# Patient Record
Sex: FEMALE | Race: WHITE | NOT HISPANIC OR LATINO | Employment: FULL TIME | URBAN - NONMETROPOLITAN AREA
[De-identification: names, ages, dates, MRNs, and addresses within clinical notes are randomized per-mention and may not be internally consistent; named-entity substitution may affect disease eponyms.]

---

## 2018-08-03 PROCEDURE — 88305 TISSUE EXAM BY PATHOLOGIST: CPT | Performed by: SURGERY

## 2018-08-06 ENCOUNTER — LAB REQUISITION (OUTPATIENT)
Dept: LAB | Facility: HOSPITAL | Age: 48
End: 2018-08-06

## 2018-08-06 DIAGNOSIS — Z00.00 ENCOUNTER FOR GENERAL ADULT MEDICAL EXAMINATION WITHOUT ABNORMAL FINDINGS: ICD-10-CM

## 2018-08-08 LAB
CYTO UR: NORMAL
LAB AP CASE REPORT: NORMAL
LAB AP CLINICAL INFORMATION: NORMAL
PATH REPORT.FINAL DX SPEC: NORMAL
PATH REPORT.GROSS SPEC: NORMAL

## 2019-09-11 ENCOUNTER — OFFICE VISIT (OUTPATIENT)
Dept: VASCULAR SURGERY | Age: 49
End: 2019-09-11
Payer: COMMERCIAL

## 2019-09-11 VITALS
WEIGHT: 155 LBS | HEIGHT: 63 IN | DIASTOLIC BLOOD PRESSURE: 69 MMHG | HEART RATE: 111 BPM | SYSTOLIC BLOOD PRESSURE: 132 MMHG | RESPIRATION RATE: 18 BRPM | TEMPERATURE: 98.2 F | BODY MASS INDEX: 27.46 KG/M2

## 2019-09-11 DIAGNOSIS — I73.9 PVD (PERIPHERAL VASCULAR DISEASE) (HCC): Primary | ICD-10-CM

## 2019-09-11 PROCEDURE — 99204 OFFICE O/P NEW MOD 45 MIN: CPT | Performed by: PHYSICIAN ASSISTANT

## 2019-09-11 RX ORDER — PAROXETINE 30 MG/1
TABLET, FILM COATED ORAL
COMMUNITY
Start: 2019-08-16

## 2019-09-11 RX ORDER — CLOPIDOGREL BISULFATE 75 MG/1
TABLET ORAL
COMMUNITY
Start: 2019-08-09

## 2019-09-11 RX ORDER — AMLODIPINE BESYLATE 10 MG/1
TABLET ORAL
COMMUNITY
Start: 2019-08-09

## 2019-09-11 RX ORDER — OXYCODONE AND ACETAMINOPHEN 7.5; 325 MG/1; MG/1
TABLET ORAL
COMMUNITY

## 2019-09-11 RX ORDER — CYCLOBENZAPRINE HCL 5 MG
TABLET ORAL
COMMUNITY
Start: 2019-08-14

## 2019-09-12 ENCOUNTER — TELEPHONE (OUTPATIENT)
Dept: VASCULAR SURGERY | Age: 49
End: 2019-09-12

## 2019-09-12 DIAGNOSIS — L81.9 DISCOLORATION OF SKIN OF FINGER: ICD-10-CM

## 2019-09-12 DIAGNOSIS — I73.9 PVD (PERIPHERAL VASCULAR DISEASE) (HCC): Primary | ICD-10-CM

## 2019-09-12 DIAGNOSIS — M79.645 PAIN IN LEFT FINGER(S): ICD-10-CM

## 2019-10-04 DIAGNOSIS — M79.645 PAIN IN LEFT FINGER(S): ICD-10-CM

## 2019-10-04 DIAGNOSIS — I73.9 PVD (PERIPHERAL VASCULAR DISEASE) (HCC): Primary | ICD-10-CM

## 2019-10-04 DIAGNOSIS — L81.9 DISCOLORATION OF SKIN OF FINGER: ICD-10-CM

## 2019-10-04 PROBLEM — I70.209 ATHEROSCLEROSIS OF NATIVE ARTERY OF EXTREMITY (HCC): Status: ACTIVE | Noted: 2019-10-04

## 2022-11-01 ENCOUNTER — APPOINTMENT (OUTPATIENT)
Dept: CT IMAGING | Facility: HOSPITAL | Age: 52
End: 2022-11-01

## 2022-11-01 ENCOUNTER — HOSPITAL ENCOUNTER (INPATIENT)
Facility: HOSPITAL | Age: 52
LOS: 1 days | Discharge: HOME | End: 2022-11-03
Attending: STUDENT IN AN ORGANIZED HEALTH CARE EDUCATION/TRAINING PROGRAM | Admitting: INTERNAL MEDICINE

## 2022-11-01 ENCOUNTER — APPOINTMENT (OUTPATIENT)
Dept: GENERAL RADIOLOGY | Facility: HOSPITAL | Age: 52
End: 2022-11-01

## 2022-11-01 DIAGNOSIS — R07.89 CHEST PAIN, ATYPICAL: ICD-10-CM

## 2022-11-01 DIAGNOSIS — I21.4 NSTEMI (NON-ST ELEVATED MYOCARDIAL INFARCTION): ICD-10-CM

## 2022-11-01 DIAGNOSIS — R77.8 ELEVATED TROPONIN: Primary | ICD-10-CM

## 2022-11-01 LAB
ALBUMIN SERPL-MCNC: 4.1 G/DL (ref 3.5–5.2)
ALBUMIN/GLOB SERPL: 1.5 G/DL
ALP SERPL-CCNC: 199 U/L (ref 39–117)
ALT SERPL W P-5'-P-CCNC: 60 U/L (ref 1–33)
ANION GAP SERPL CALCULATED.3IONS-SCNC: 14 MMOL/L (ref 5–15)
APTT PPP: 68.7 SECONDS (ref 24.1–35)
AST SERPL-CCNC: 35 U/L (ref 1–32)
BASOPHILS # BLD AUTO: 0.04 10*3/MM3 (ref 0–0.2)
BASOPHILS NFR BLD AUTO: 0.2 % (ref 0–1.5)
BILIRUB SERPL-MCNC: 0.3 MG/DL (ref 0–1.2)
BUN SERPL-MCNC: 13 MG/DL (ref 6–20)
BUN/CREAT SERPL: 24.5 (ref 7–25)
CALCIUM SPEC-SCNC: 8.9 MG/DL (ref 8.6–10.5)
CHLORIDE SERPL-SCNC: 103 MMOL/L (ref 98–107)
CO2 SERPL-SCNC: 22 MMOL/L (ref 22–29)
CREAT SERPL-MCNC: 0.53 MG/DL (ref 0.57–1)
DEPRECATED RDW RBC AUTO: 41.7 FL (ref 37–54)
EGFRCR SERPLBLD CKD-EPI 2021: 111.4 ML/MIN/1.73
EOSINOPHIL # BLD AUTO: 0 10*3/MM3 (ref 0–0.4)
EOSINOPHIL NFR BLD AUTO: 0 % (ref 0.3–6.2)
ERYTHROCYTE [DISTWIDTH] IN BLOOD BY AUTOMATED COUNT: 12.6 % (ref 12.3–15.4)
GLOBULIN UR ELPH-MCNC: 2.8 GM/DL
GLUCOSE SERPL-MCNC: 140 MG/DL (ref 65–99)
HCT VFR BLD AUTO: 35.9 % (ref 34–46.6)
HGB BLD-MCNC: 11.9 G/DL (ref 12–15.9)
HOLD SPECIMEN: NORMAL
HOLD SPECIMEN: NORMAL
IMM GRANULOCYTES # BLD AUTO: 0.13 10*3/MM3 (ref 0–0.05)
IMM GRANULOCYTES NFR BLD AUTO: 0.7 % (ref 0–0.5)
INR PPP: 1.15 (ref 0.91–1.09)
LIPASE SERPL-CCNC: 11 U/L (ref 13–60)
LYMPHOCYTES # BLD AUTO: 1.02 10*3/MM3 (ref 0.7–3.1)
LYMPHOCYTES NFR BLD AUTO: 5.5 % (ref 19.6–45.3)
MCH RBC QN AUTO: 30.1 PG (ref 26.6–33)
MCHC RBC AUTO-ENTMCNC: 33.1 G/DL (ref 31.5–35.7)
MCV RBC AUTO: 90.9 FL (ref 79–97)
MONOCYTES # BLD AUTO: 1.5 10*3/MM3 (ref 0.1–0.9)
MONOCYTES NFR BLD AUTO: 8.1 % (ref 5–12)
NEUTROPHILS NFR BLD AUTO: 15.78 10*3/MM3 (ref 1.7–7)
NEUTROPHILS NFR BLD AUTO: 85.5 % (ref 42.7–76)
NRBC BLD AUTO-RTO: 0 /100 WBC (ref 0–0.2)
PLATELET # BLD AUTO: 377 10*3/MM3 (ref 140–450)
PMV BLD AUTO: 9.7 FL (ref 6–12)
POTASSIUM SERPL-SCNC: 3.1 MMOL/L (ref 3.5–5.2)
PROT SERPL-MCNC: 6.9 G/DL (ref 6–8.5)
PROTHROMBIN TIME: 14.2 SECONDS (ref 11.9–14.6)
RBC # BLD AUTO: 3.95 10*6/MM3 (ref 3.77–5.28)
SARS-COV-2 RNA RESP QL NAA+PROBE: NOT DETECTED
SODIUM SERPL-SCNC: 139 MMOL/L (ref 136–145)
TROPONIN T SERPL-MCNC: 0.39 NG/ML (ref 0–0.03)
TROPONIN T SERPL-MCNC: 0.45 NG/ML (ref 0–0.03)
WBC NRBC COR # BLD: 18.47 10*3/MM3 (ref 3.4–10.8)
WHOLE BLOOD HOLD COAG: NORMAL
WHOLE BLOOD HOLD SPECIMEN: NORMAL

## 2022-11-01 PROCEDURE — 36415 COLL VENOUS BLD VENIPUNCTURE: CPT

## 2022-11-01 PROCEDURE — 71045 X-RAY EXAM CHEST 1 VIEW: CPT

## 2022-11-01 PROCEDURE — 83690 ASSAY OF LIPASE: CPT | Performed by: STUDENT IN AN ORGANIZED HEALTH CARE EDUCATION/TRAINING PROGRAM

## 2022-11-01 PROCEDURE — 93005 ELECTROCARDIOGRAM TRACING: CPT | Performed by: STUDENT IN AN ORGANIZED HEALTH CARE EDUCATION/TRAINING PROGRAM

## 2022-11-01 PROCEDURE — 93010 ELECTROCARDIOGRAM REPORT: CPT | Performed by: INTERNAL MEDICINE

## 2022-11-01 PROCEDURE — 80053 COMPREHEN METABOLIC PANEL: CPT | Performed by: STUDENT IN AN ORGANIZED HEALTH CARE EDUCATION/TRAINING PROGRAM

## 2022-11-01 PROCEDURE — 99285 EMERGENCY DEPT VISIT HI MDM: CPT

## 2022-11-01 PROCEDURE — U0003 INFECTIOUS AGENT DETECTION BY NUCLEIC ACID (DNA OR RNA); SEVERE ACUTE RESPIRATORY SYNDROME CORONAVIRUS 2 (SARS-COV-2) (CORONAVIRUS DISEASE [COVID-19]), AMPLIFIED PROBE TECHNIQUE, MAKING USE OF HIGH THROUGHPUT TECHNOLOGIES AS DESCRIBED BY CMS-2020-01-R: HCPCS | Performed by: STUDENT IN AN ORGANIZED HEALTH CARE EDUCATION/TRAINING PROGRAM

## 2022-11-01 PROCEDURE — 74177 CT ABD & PELVIS W/CONTRAST: CPT

## 2022-11-01 PROCEDURE — 85610 PROTHROMBIN TIME: CPT | Performed by: STUDENT IN AN ORGANIZED HEALTH CARE EDUCATION/TRAINING PROGRAM

## 2022-11-01 PROCEDURE — 71275 CT ANGIOGRAPHY CHEST: CPT

## 2022-11-01 PROCEDURE — 85025 COMPLETE CBC W/AUTO DIFF WBC: CPT | Performed by: STUDENT IN AN ORGANIZED HEALTH CARE EDUCATION/TRAINING PROGRAM

## 2022-11-01 PROCEDURE — 83735 ASSAY OF MAGNESIUM: CPT | Performed by: NURSE PRACTITIONER

## 2022-11-01 PROCEDURE — 85730 THROMBOPLASTIN TIME PARTIAL: CPT | Performed by: STUDENT IN AN ORGANIZED HEALTH CARE EDUCATION/TRAINING PROGRAM

## 2022-11-01 PROCEDURE — 84484 ASSAY OF TROPONIN QUANT: CPT | Performed by: STUDENT IN AN ORGANIZED HEALTH CARE EDUCATION/TRAINING PROGRAM

## 2022-11-01 PROCEDURE — 25010000002 FENTANYL CITRATE (PF) 50 MCG/ML SOLUTION: Performed by: STUDENT IN AN ORGANIZED HEALTH CARE EDUCATION/TRAINING PROGRAM

## 2022-11-01 PROCEDURE — 0 IOPAMIDOL PER 1 ML: Performed by: STUDENT IN AN ORGANIZED HEALTH CARE EDUCATION/TRAINING PROGRAM

## 2022-11-01 PROCEDURE — 0 HYDROMORPHONE 1 MG/ML SOLUTION: Performed by: STUDENT IN AN ORGANIZED HEALTH CARE EDUCATION/TRAINING PROGRAM

## 2022-11-01 RX ORDER — NITROGLYCERIN 20 MG/100ML
5-200 INJECTION INTRAVENOUS
Status: DISCONTINUED | OUTPATIENT
Start: 2022-11-01 | End: 2022-11-03 | Stop reason: HOSPADM

## 2022-11-01 RX ORDER — ENOXAPARIN SODIUM 100 MG/ML
1 INJECTION SUBCUTANEOUS ONCE
Status: COMPLETED | OUTPATIENT
Start: 2022-11-01 | End: 2022-11-02

## 2022-11-01 RX ORDER — FENTANYL CITRATE 50 UG/ML
1 INJECTION, SOLUTION INTRAMUSCULAR; INTRAVENOUS ONCE
Status: COMPLETED | OUTPATIENT
Start: 2022-11-01 | End: 2022-11-01

## 2022-11-01 RX ADMIN — FENTANYL CITRATE 76 MCG: 50 INJECTION INTRAMUSCULAR; INTRAVENOUS at 22:29

## 2022-11-01 RX ADMIN — IOPAMIDOL 100 ML: 755 INJECTION, SOLUTION INTRAVENOUS at 22:20

## 2022-11-01 RX ADMIN — HYDROMORPHONE HYDROCHLORIDE 0.5 MG: 1 INJECTION, SOLUTION INTRAMUSCULAR; INTRAVENOUS; SUBCUTANEOUS at 21:04

## 2022-11-02 PROBLEM — F41.9 ANXIETY DISORDER: Status: ACTIVE | Noted: 2022-11-02

## 2022-11-02 PROBLEM — D72.829 LEUKOCYTOSIS: Status: ACTIVE | Noted: 2022-11-02

## 2022-11-02 PROBLEM — E87.6 HYPOKALEMIA: Status: ACTIVE | Noted: 2022-11-02

## 2022-11-02 PROBLEM — R74.01 TRANSAMINITIS: Status: ACTIVE | Noted: 2022-11-02

## 2022-11-02 PROBLEM — I21.4 NSTEMI (NON-ST ELEVATED MYOCARDIAL INFARCTION): Status: ACTIVE | Noted: 2022-11-02

## 2022-11-02 PROBLEM — Z79.02 LONG TERM CURRENT USE OF ANTITHROMBOTICS/ANTIPLATELETS: Status: ACTIVE | Noted: 2022-11-02

## 2022-11-02 PROBLEM — R77.8 ELEVATED TROPONIN: Status: ACTIVE | Noted: 2022-11-02

## 2022-11-02 PROBLEM — R79.89 ELEVATED TROPONIN: Status: ACTIVE | Noted: 2022-11-02

## 2022-11-02 PROBLEM — R07.89 CHEST PAIN, ATYPICAL: Status: ACTIVE | Noted: 2022-11-02

## 2022-11-02 LAB
ALBUMIN SERPL-MCNC: 3.9 G/DL (ref 3.5–5.2)
ALBUMIN/GLOB SERPL: 1.4 G/DL
ALP SERPL-CCNC: 172 U/L (ref 39–117)
ALT SERPL W P-5'-P-CCNC: 50 U/L (ref 1–33)
ANION GAP SERPL CALCULATED.3IONS-SCNC: 11 MMOL/L (ref 5–15)
AST SERPL-CCNC: 28 U/L (ref 1–32)
BASOPHILS # BLD AUTO: 0.04 10*3/MM3 (ref 0–0.2)
BASOPHILS NFR BLD AUTO: 0.3 % (ref 0–1.5)
BILIRUB SERPL-MCNC: 0.4 MG/DL (ref 0–1.2)
BUN SERPL-MCNC: 13 MG/DL (ref 6–20)
BUN/CREAT SERPL: 20.3 (ref 7–25)
CALCIUM SPEC-SCNC: 8.6 MG/DL (ref 8.6–10.5)
CHLORIDE SERPL-SCNC: 105 MMOL/L (ref 98–107)
CHOLEST SERPL-MCNC: 148 MG/DL (ref 0–200)
CO2 SERPL-SCNC: 23 MMOL/L (ref 22–29)
CREAT SERPL-MCNC: 0.64 MG/DL (ref 0.57–1)
DEPRECATED RDW RBC AUTO: 41.9 FL (ref 37–54)
EGFRCR SERPLBLD CKD-EPI 2021: 106.5 ML/MIN/1.73
EOSINOPHIL # BLD AUTO: 0 10*3/MM3 (ref 0–0.4)
EOSINOPHIL NFR BLD AUTO: 0 % (ref 0.3–6.2)
ERYTHROCYTE [DISTWIDTH] IN BLOOD BY AUTOMATED COUNT: 12.6 % (ref 12.3–15.4)
GLOBULIN UR ELPH-MCNC: 2.7 GM/DL
GLUCOSE SERPL-MCNC: 137 MG/DL (ref 65–99)
HBA1C MFR BLD: 5.7 % (ref 4.8–5.6)
HCT VFR BLD AUTO: 34.3 % (ref 34–46.6)
HDLC SERPL-MCNC: 53 MG/DL (ref 40–60)
HGB BLD-MCNC: 11.2 G/DL (ref 12–15.9)
IMM GRANULOCYTES # BLD AUTO: 0.05 10*3/MM3 (ref 0–0.05)
IMM GRANULOCYTES NFR BLD AUTO: 0.4 % (ref 0–0.5)
LDLC SERPL CALC-MCNC: 74 MG/DL (ref 0–100)
LDLC/HDLC SERPL: 1.36 {RATIO}
LYMPHOCYTES # BLD AUTO: 1.78 10*3/MM3 (ref 0.7–3.1)
LYMPHOCYTES NFR BLD AUTO: 14.9 % (ref 19.6–45.3)
MAGNESIUM SERPL-MCNC: 1.7 MG/DL (ref 1.6–2.6)
MCH RBC QN AUTO: 29.9 PG (ref 26.6–33)
MCHC RBC AUTO-ENTMCNC: 32.7 G/DL (ref 31.5–35.7)
MCV RBC AUTO: 91.5 FL (ref 79–97)
MONOCYTES # BLD AUTO: 1.49 10*3/MM3 (ref 0.1–0.9)
MONOCYTES NFR BLD AUTO: 12.5 % (ref 5–12)
NEUTROPHILS NFR BLD AUTO: 71.9 % (ref 42.7–76)
NEUTROPHILS NFR BLD AUTO: 8.57 10*3/MM3 (ref 1.7–7)
NRBC BLD AUTO-RTO: 0 /100 WBC (ref 0–0.2)
NT-PROBNP SERPL-MCNC: ABNORMAL PG/ML (ref 0–900)
PLATELET # BLD AUTO: 315 10*3/MM3 (ref 140–450)
PMV BLD AUTO: 9.5 FL (ref 6–12)
POTASSIUM SERPL-SCNC: 4.3 MMOL/L (ref 3.5–5.2)
PROT SERPL-MCNC: 6.6 G/DL (ref 6–8.5)
RBC # BLD AUTO: 3.75 10*6/MM3 (ref 3.77–5.28)
SODIUM SERPL-SCNC: 139 MMOL/L (ref 136–145)
TRIGL SERPL-MCNC: 115 MG/DL (ref 0–150)
TROPONIN T SERPL-MCNC: 0.27 NG/ML (ref 0–0.03)
VLDLC SERPL-MCNC: 21 MG/DL (ref 5–40)
WBC NRBC COR # BLD: 11.93 10*3/MM3 (ref 3.4–10.8)

## 2022-11-02 PROCEDURE — 80053 COMPREHEN METABOLIC PANEL: CPT | Performed by: NURSE PRACTITIONER

## 2022-11-02 PROCEDURE — 93010 ELECTROCARDIOGRAM REPORT: CPT | Performed by: INTERNAL MEDICINE

## 2022-11-02 PROCEDURE — 25010000002 ENOXAPARIN PER 10 MG: Performed by: STUDENT IN AN ORGANIZED HEALTH CARE EDUCATION/TRAINING PROGRAM

## 2022-11-02 PROCEDURE — 25010000002 MIDAZOLAM PER 1 MG: Performed by: EMERGENCY MEDICINE

## 2022-11-02 PROCEDURE — 80061 LIPID PANEL: CPT | Performed by: NURSE PRACTITIONER

## 2022-11-02 PROCEDURE — B2111ZZ FLUOROSCOPY OF MULTIPLE CORONARY ARTERIES USING LOW OSMOLAR CONTRAST: ICD-10-PCS | Performed by: EMERGENCY MEDICINE

## 2022-11-02 PROCEDURE — B2151ZZ FLUOROSCOPY OF LEFT HEART USING LOW OSMOLAR CONTRAST: ICD-10-PCS | Performed by: EMERGENCY MEDICINE

## 2022-11-02 PROCEDURE — C1760 CLOSURE DEV, VASC: HCPCS | Performed by: EMERGENCY MEDICINE

## 2022-11-02 PROCEDURE — 36415 COLL VENOUS BLD VENIPUNCTURE: CPT | Performed by: NURSE PRACTITIONER

## 2022-11-02 PROCEDURE — 4A023N7 MEASUREMENT OF CARDIAC SAMPLING AND PRESSURE, LEFT HEART, PERCUTANEOUS APPROACH: ICD-10-PCS | Performed by: EMERGENCY MEDICINE

## 2022-11-02 PROCEDURE — C1894 INTRO/SHEATH, NON-LASER: HCPCS | Performed by: EMERGENCY MEDICINE

## 2022-11-02 PROCEDURE — 93458 L HRT ARTERY/VENTRICLE ANGIO: CPT | Performed by: EMERGENCY MEDICINE

## 2022-11-02 PROCEDURE — 25010000002 POTASSIUM CHLORIDE PER 2 MEQ: Performed by: STUDENT IN AN ORGANIZED HEALTH CARE EDUCATION/TRAINING PROGRAM

## 2022-11-02 PROCEDURE — 25010000002 KETOROLAC TROMETHAMINE PER 15 MG: Performed by: FAMILY MEDICINE

## 2022-11-02 PROCEDURE — 99152 MOD SED SAME PHYS/QHP 5/>YRS: CPT | Performed by: EMERGENCY MEDICINE

## 2022-11-02 PROCEDURE — 25010000002 HEPARIN (PORCINE) 1000-0.9 UT/500ML-% SOLUTION: Performed by: EMERGENCY MEDICINE

## 2022-11-02 PROCEDURE — 83036 HEMOGLOBIN GLYCOSYLATED A1C: CPT | Performed by: NURSE PRACTITIONER

## 2022-11-02 PROCEDURE — 85025 COMPLETE CBC W/AUTO DIFF WBC: CPT | Performed by: NURSE PRACTITIONER

## 2022-11-02 PROCEDURE — 83880 ASSAY OF NATRIURETIC PEPTIDE: CPT | Performed by: NURSE PRACTITIONER

## 2022-11-02 PROCEDURE — 25010000002 FENTANYL CITRATE (PF) 50 MCG/ML SOLUTION: Performed by: EMERGENCY MEDICINE

## 2022-11-02 PROCEDURE — 25010000002 LEVOFLOXACIN PER 250 MG: Performed by: EMERGENCY MEDICINE

## 2022-11-02 PROCEDURE — 99222 1ST HOSP IP/OBS MODERATE 55: CPT | Performed by: NURSE PRACTITIONER

## 2022-11-02 PROCEDURE — 0 IOPAMIDOL PER 1 ML: Performed by: EMERGENCY MEDICINE

## 2022-11-02 PROCEDURE — 25010000002 MORPHINE PER 10 MG: Performed by: INTERNAL MEDICINE

## 2022-11-02 PROCEDURE — 25010000002 HEPARIN (PORCINE) 2000-0.9 UNIT/L-% SOLUTION: Performed by: EMERGENCY MEDICINE

## 2022-11-02 PROCEDURE — 25010000002 DIPHENHYDRAMINE PER 50 MG: Performed by: EMERGENCY MEDICINE

## 2022-11-02 PROCEDURE — 25010000002 MORPHINE PER 10 MG: Performed by: NURSE PRACTITIONER

## 2022-11-02 PROCEDURE — 84484 ASSAY OF TROPONIN QUANT: CPT | Performed by: NURSE PRACTITIONER

## 2022-11-02 PROCEDURE — B41F1ZZ FLUOROSCOPY OF RIGHT LOWER EXTREMITY ARTERIES USING LOW OSMOLAR CONTRAST: ICD-10-PCS | Performed by: EMERGENCY MEDICINE

## 2022-11-02 PROCEDURE — 25010000002 ONDANSETRON PER 1 MG: Performed by: NURSE PRACTITIONER

## 2022-11-02 PROCEDURE — C1769 GUIDE WIRE: HCPCS | Performed by: EMERGENCY MEDICINE

## 2022-11-02 RX ORDER — ONDANSETRON 2 MG/ML
4 INJECTION INTRAMUSCULAR; INTRAVENOUS EVERY 6 HOURS PRN
Status: DISCONTINUED | OUTPATIENT
Start: 2022-11-02 | End: 2022-11-03 | Stop reason: HOSPADM

## 2022-11-02 RX ORDER — ONDANSETRON 2 MG/ML
4 INJECTION INTRAMUSCULAR; INTRAVENOUS EVERY 6 HOURS PRN
Status: DISCONTINUED | OUTPATIENT
Start: 2022-11-02 | End: 2022-11-02 | Stop reason: HOSPADM

## 2022-11-02 RX ORDER — LIDOCAINE HYDROCHLORIDE 20 MG/ML
5 SOLUTION OROPHARYNGEAL ONCE
Status: COMPLETED | OUTPATIENT
Start: 2022-11-02 | End: 2022-11-02

## 2022-11-02 RX ORDER — TRAZODONE HYDROCHLORIDE 50 MG/1
50 TABLET ORAL NIGHTLY
Status: DISCONTINUED | OUTPATIENT
Start: 2022-11-02 | End: 2022-11-03 | Stop reason: HOSPADM

## 2022-11-02 RX ORDER — SODIUM CHLORIDE 0.9 % (FLUSH) 0.9 %
10 SYRINGE (ML) INJECTION EVERY 12 HOURS SCHEDULED
Status: DISCONTINUED | OUTPATIENT
Start: 2022-11-02 | End: 2022-11-03 | Stop reason: HOSPADM

## 2022-11-02 RX ORDER — POTASSIUM CHLORIDE 750 MG/1
40 CAPSULE, EXTENDED RELEASE ORAL
Status: COMPLETED | OUTPATIENT
Start: 2022-11-02 | End: 2022-11-02

## 2022-11-02 RX ORDER — SERTRALINE HYDROCHLORIDE 100 MG/1
100 TABLET, FILM COATED ORAL DAILY
Status: DISCONTINUED | OUTPATIENT
Start: 2022-11-02 | End: 2022-11-03 | Stop reason: HOSPADM

## 2022-11-02 RX ORDER — ALUMINA, MAGNESIA, AND SIMETHICONE 2400; 2400; 240 MG/30ML; MG/30ML; MG/30ML
30 SUSPENSION ORAL ONCE
Status: COMPLETED | OUTPATIENT
Start: 2022-11-02 | End: 2022-11-02

## 2022-11-02 RX ORDER — LEVOFLOXACIN 5 MG/ML
500 INJECTION, SOLUTION INTRAVENOUS EVERY 24 HOURS
Status: DISCONTINUED | OUTPATIENT
Start: 2022-11-02 | End: 2022-11-03 | Stop reason: HOSPADM

## 2022-11-02 RX ORDER — HEPARIN SODIUM 200 [USP'U]/100ML
INJECTION, SOLUTION INTRAVENOUS
Status: DISCONTINUED | OUTPATIENT
Start: 2022-11-02 | End: 2022-11-02 | Stop reason: HOSPADM

## 2022-11-02 RX ORDER — DICLOFENAC SODIUM 75 MG/1
75 TABLET, DELAYED RELEASE ORAL 2 TIMES DAILY
COMMUNITY
End: 2022-11-03 | Stop reason: HOSPADM

## 2022-11-02 RX ORDER — GLIPIZIDE 5 MG/1
5 TABLET ORAL
COMMUNITY

## 2022-11-02 RX ORDER — TRAZODONE HYDROCHLORIDE 50 MG/1
50 TABLET ORAL NIGHTLY
COMMUNITY

## 2022-11-02 RX ORDER — SERTRALINE HYDROCHLORIDE 100 MG/1
100 TABLET, FILM COATED ORAL DAILY
COMMUNITY

## 2022-11-02 RX ORDER — ATORVASTATIN CALCIUM 10 MG/1
20 TABLET, FILM COATED ORAL DAILY
Status: DISCONTINUED | OUTPATIENT
Start: 2022-11-02 | End: 2022-11-02

## 2022-11-02 RX ORDER — DIPHENHYDRAMINE HYDROCHLORIDE 50 MG/ML
INJECTION INTRAMUSCULAR; INTRAVENOUS
Status: DISCONTINUED | OUTPATIENT
Start: 2022-11-02 | End: 2022-11-02 | Stop reason: HOSPADM

## 2022-11-02 RX ORDER — HYDROXYZINE PAMOATE 25 MG/1
25 CAPSULE ORAL NIGHTLY
COMMUNITY

## 2022-11-02 RX ORDER — AMLODIPINE BESYLATE 10 MG/1
10 TABLET ORAL DAILY
COMMUNITY
End: 2022-11-03 | Stop reason: HOSPADM

## 2022-11-02 RX ORDER — CYCLOBENZAPRINE HCL 5 MG
5 TABLET ORAL NIGHTLY
Status: DISCONTINUED | OUTPATIENT
Start: 2022-11-02 | End: 2022-11-03 | Stop reason: HOSPADM

## 2022-11-02 RX ORDER — LIDOCAINE HYDROCHLORIDE 20 MG/ML
INJECTION, SOLUTION INFILTRATION; PERINEURAL
Status: DISCONTINUED | OUTPATIENT
Start: 2022-11-02 | End: 2022-11-02 | Stop reason: HOSPADM

## 2022-11-02 RX ORDER — ONDANSETRON 4 MG/1
4 TABLET, FILM COATED ORAL EVERY 6 HOURS PRN
Status: DISCONTINUED | OUTPATIENT
Start: 2022-11-02 | End: 2022-11-03 | Stop reason: HOSPADM

## 2022-11-02 RX ORDER — ACETAMINOPHEN 160 MG/5ML
650 SOLUTION ORAL EVERY 4 HOURS PRN
Status: DISCONTINUED | OUTPATIENT
Start: 2022-11-02 | End: 2022-11-03 | Stop reason: HOSPADM

## 2022-11-02 RX ORDER — ATORVASTATIN CALCIUM 40 MG/1
40 TABLET, FILM COATED ORAL NIGHTLY
Status: DISCONTINUED | OUTPATIENT
Start: 2022-11-02 | End: 2022-11-02

## 2022-11-02 RX ORDER — MIDAZOLAM HYDROCHLORIDE 1 MG/ML
INJECTION INTRAMUSCULAR; INTRAVENOUS
Status: DISCONTINUED | OUTPATIENT
Start: 2022-11-02 | End: 2022-11-02 | Stop reason: HOSPADM

## 2022-11-02 RX ORDER — CLOPIDOGREL BISULFATE 75 MG/1
75 TABLET ORAL DAILY
Status: DISCONTINUED | OUTPATIENT
Start: 2022-11-02 | End: 2022-11-03 | Stop reason: HOSPADM

## 2022-11-02 RX ORDER — DIPHENHYDRAMINE HCL 12.5MG/5ML
12.5 LIQUID (ML) ORAL ONCE
Status: COMPLETED | OUTPATIENT
Start: 2022-11-02 | End: 2022-11-02

## 2022-11-02 RX ORDER — HYDROXYZINE 50 MG/1
50 TABLET, FILM COATED ORAL DAILY
Status: ON HOLD | COMMUNITY
End: 2022-11-02

## 2022-11-02 RX ORDER — FAMOTIDINE 10 MG/ML
20 INJECTION, SOLUTION INTRAVENOUS EVERY 12 HOURS SCHEDULED
Status: DISCONTINUED | OUTPATIENT
Start: 2022-11-02 | End: 2022-11-03 | Stop reason: HOSPADM

## 2022-11-02 RX ORDER — CLOPIDOGREL BISULFATE 75 MG/1
75 TABLET ORAL DAILY
COMMUNITY
End: 2022-11-17 | Stop reason: SDUPTHER

## 2022-11-02 RX ORDER — AMLODIPINE BESYLATE 10 MG/1
10 TABLET ORAL DAILY
Status: DISCONTINUED | OUTPATIENT
Start: 2022-11-02 | End: 2022-11-03

## 2022-11-02 RX ORDER — ACETAMINOPHEN 325 MG/1
650 TABLET ORAL EVERY 4 HOURS PRN
Status: DISCONTINUED | OUTPATIENT
Start: 2022-11-02 | End: 2022-11-03 | Stop reason: HOSPADM

## 2022-11-02 RX ORDER — FAMOTIDINE 20 MG/1
20 TABLET, FILM COATED ORAL 2 TIMES DAILY
COMMUNITY

## 2022-11-02 RX ORDER — ATORVASTATIN CALCIUM 20 MG/1
20 TABLET, FILM COATED ORAL DAILY
COMMUNITY
End: 2022-11-03 | Stop reason: HOSPADM

## 2022-11-02 RX ORDER — ACETAMINOPHEN 650 MG/1
650 SUPPOSITORY RECTAL EVERY 4 HOURS PRN
Status: DISCONTINUED | OUTPATIENT
Start: 2022-11-02 | End: 2022-11-03 | Stop reason: HOSPADM

## 2022-11-02 RX ORDER — SODIUM CHLORIDE 0.9 % (FLUSH) 0.9 %
3 SYRINGE (ML) INJECTION EVERY 12 HOURS SCHEDULED
Status: DISCONTINUED | OUTPATIENT
Start: 2022-11-02 | End: 2022-11-02 | Stop reason: HOSPADM

## 2022-11-02 RX ORDER — ATORVASTATIN CALCIUM 40 MG/1
40 TABLET, FILM COATED ORAL DAILY
Status: DISCONTINUED | OUTPATIENT
Start: 2022-11-02 | End: 2022-11-03 | Stop reason: HOSPADM

## 2022-11-02 RX ORDER — POTASSIUM CHLORIDE 14.9 MG/ML
20 INJECTION INTRAVENOUS ONCE
Status: COMPLETED | OUTPATIENT
Start: 2022-11-02 | End: 2022-11-02

## 2022-11-02 RX ORDER — GLIPIZIDE 5 MG/1
5 TABLET ORAL
Status: DISCONTINUED | OUTPATIENT
Start: 2022-11-02 | End: 2022-11-03 | Stop reason: HOSPADM

## 2022-11-02 RX ORDER — ACETAMINOPHEN 325 MG/1
650 TABLET ORAL EVERY 4 HOURS PRN
Status: DISCONTINUED | OUTPATIENT
Start: 2022-11-02 | End: 2022-11-02 | Stop reason: SDUPTHER

## 2022-11-02 RX ORDER — SODIUM CHLORIDE 0.9 % (FLUSH) 0.9 %
3-10 SYRINGE (ML) INJECTION AS NEEDED
Status: DISCONTINUED | OUTPATIENT
Start: 2022-11-02 | End: 2022-11-02 | Stop reason: HOSPADM

## 2022-11-02 RX ORDER — MORPHINE SULFATE 2 MG/ML
2 INJECTION, SOLUTION INTRAMUSCULAR; INTRAVENOUS ONCE
Status: COMPLETED | OUTPATIENT
Start: 2022-11-02 | End: 2022-11-02

## 2022-11-02 RX ORDER — NITROGLYCERIN 0.4 MG/1
0.4 TABLET SUBLINGUAL
Status: DISCONTINUED | OUTPATIENT
Start: 2022-11-02 | End: 2022-11-02 | Stop reason: HOSPADM

## 2022-11-02 RX ORDER — MORPHINE SULFATE 2 MG/ML
1 INJECTION, SOLUTION INTRAMUSCULAR; INTRAVENOUS ONCE
Status: COMPLETED | OUTPATIENT
Start: 2022-11-02 | End: 2022-11-02

## 2022-11-02 RX ORDER — CYCLOBENZAPRINE HCL 10 MG
10 TABLET ORAL NIGHTLY
COMMUNITY

## 2022-11-02 RX ORDER — METRONIDAZOLE 500 MG/100ML
500 INJECTION, SOLUTION INTRAVENOUS EVERY 8 HOURS
Status: DISCONTINUED | OUTPATIENT
Start: 2022-11-02 | End: 2022-11-03 | Stop reason: HOSPADM

## 2022-11-02 RX ORDER — BUSPIRONE HYDROCHLORIDE 15 MG/1
15 TABLET ORAL 3 TIMES DAILY
COMMUNITY

## 2022-11-02 RX ORDER — KETOROLAC TROMETHAMINE 15 MG/ML
15 INJECTION, SOLUTION INTRAMUSCULAR; INTRAVENOUS EVERY 6 HOURS PRN
Status: DISCONTINUED | OUTPATIENT
Start: 2022-11-02 | End: 2022-11-03 | Stop reason: HOSPADM

## 2022-11-02 RX ORDER — SODIUM CHLORIDE 0.9 % (FLUSH) 0.9 %
10 SYRINGE (ML) INJECTION AS NEEDED
Status: DISCONTINUED | OUTPATIENT
Start: 2022-11-02 | End: 2022-11-03 | Stop reason: HOSPADM

## 2022-11-02 RX ORDER — SODIUM CHLORIDE 9 MG/ML
50 INJECTION, SOLUTION INTRAVENOUS CONTINUOUS
Status: DISCONTINUED | OUTPATIENT
Start: 2022-11-02 | End: 2022-11-03 | Stop reason: HOSPADM

## 2022-11-02 RX ORDER — ENOXAPARIN SODIUM 100 MG/ML
1 INJECTION SUBCUTANEOUS EVERY 12 HOURS SCHEDULED
Status: DISCONTINUED | OUTPATIENT
Start: 2022-11-02 | End: 2022-11-03 | Stop reason: HOSPADM

## 2022-11-02 RX ORDER — HYDROXYZINE HYDROCHLORIDE 25 MG/1
50 TABLET, FILM COATED ORAL DAILY
Status: DISCONTINUED | OUTPATIENT
Start: 2022-11-02 | End: 2022-11-03 | Stop reason: HOSPADM

## 2022-11-02 RX ORDER — FENTANYL CITRATE 50 UG/ML
INJECTION, SOLUTION INTRAMUSCULAR; INTRAVENOUS
Status: DISCONTINUED | OUTPATIENT
Start: 2022-11-02 | End: 2022-11-02 | Stop reason: HOSPADM

## 2022-11-02 RX ADMIN — DIPHENHYDRAMINE HYDROCHLORIDE 12.5 MG: 12.5 SOLUTION ORAL at 01:08

## 2022-11-02 RX ADMIN — ONDANSETRON 4 MG: 2 INJECTION INTRAMUSCULAR; INTRAVENOUS at 10:53

## 2022-11-02 RX ADMIN — CLOPIDOGREL 75 MG: 75 TABLET, FILM COATED ORAL at 09:58

## 2022-11-02 RX ADMIN — Medication 10 ML: at 20:30

## 2022-11-02 RX ADMIN — POTASSIUM CHLORIDE 40 MEQ: 10 CAPSULE, COATED, EXTENDED RELEASE ORAL at 04:37

## 2022-11-02 RX ADMIN — DIPHENHYDRAMINE HYDROCHLORIDE 12.5 MG: 12.5 SOLUTION ORAL at 20:23

## 2022-11-02 RX ADMIN — LIDOCAINE HYDROCHLORIDE 5 ML: 20 SOLUTION ORAL; TOPICAL at 01:06

## 2022-11-02 RX ADMIN — BUSPIRONE HYDROCHLORIDE 15 MG: 5 TABLET ORAL at 20:27

## 2022-11-02 RX ADMIN — MORPHINE SULFATE 2 MG: 2 INJECTION, SOLUTION INTRAMUSCULAR; INTRAVENOUS at 10:47

## 2022-11-02 RX ADMIN — SERTRALINE HYDROCHLORIDE 100 MG: 100 TABLET, FILM COATED ORAL at 18:22

## 2022-11-02 RX ADMIN — LEVOFLOXACIN 500 MG: 5 INJECTION, SOLUTION INTRAVENOUS at 18:22

## 2022-11-02 RX ADMIN — LIDOCAINE HYDROCHLORIDE 5 ML: 20 SOLUTION ORAL; TOPICAL at 20:23

## 2022-11-02 RX ADMIN — FAMOTIDINE 20 MG: 10 INJECTION INTRAVENOUS at 08:41

## 2022-11-02 RX ADMIN — HYDROXYZINE HYDROCHLORIDE 50 MG: 25 TABLET ORAL at 18:22

## 2022-11-02 RX ADMIN — TRAZODONE HYDROCHLORIDE 50 MG: 50 TABLET ORAL at 03:06

## 2022-11-02 RX ADMIN — SODIUM CHLORIDE 100 ML/HR: 9 INJECTION, SOLUTION INTRAVENOUS at 18:22

## 2022-11-02 RX ADMIN — ENOXAPARIN SODIUM 80 MG: 100 INJECTION SUBCUTANEOUS at 00:56

## 2022-11-02 RX ADMIN — CYCLOBENZAPRINE 5 MG: 5 TABLET, FILM COATED ORAL at 20:27

## 2022-11-02 RX ADMIN — POTASSIUM CHLORIDE 40 MEQ: 10 CAPSULE, COATED, EXTENDED RELEASE ORAL at 01:10

## 2022-11-02 RX ADMIN — ALUMINUM HYDROXIDE, MAGNESIUM HYDROXIDE, AND DIMETHICONE 30 ML: 400; 400; 40 SUSPENSION ORAL at 20:16

## 2022-11-02 RX ADMIN — AMLODIPINE BESYLATE 10 MG: 10 TABLET ORAL at 10:47

## 2022-11-02 RX ADMIN — BUSPIRONE HYDROCHLORIDE 15 MG: 5 TABLET ORAL at 09:54

## 2022-11-02 RX ADMIN — FAMOTIDINE 20 MG: 10 INJECTION INTRAVENOUS at 20:29

## 2022-11-02 RX ADMIN — ALUMINUM HYDROXIDE, MAGNESIUM HYDROXIDE, AND DIMETHICONE 30 ML: 400; 400; 40 SUSPENSION ORAL at 01:06

## 2022-11-02 RX ADMIN — Medication 10 ML: at 09:58

## 2022-11-02 RX ADMIN — ACETAMINOPHEN 650 MG: 325 TABLET, FILM COATED ORAL at 02:40

## 2022-11-02 RX ADMIN — POTASSIUM CHLORIDE 20 MEQ: 14.9 INJECTION, SOLUTION INTRAVENOUS at 02:59

## 2022-11-02 RX ADMIN — GLIPIZIDE 5 MG: 5 TABLET ORAL at 18:22

## 2022-11-02 RX ADMIN — Medication 5000 UNITS: at 18:23

## 2022-11-02 RX ADMIN — ATORVASTATIN CALCIUM 40 MG: 40 TABLET, FILM COATED ORAL at 10:53

## 2022-11-02 RX ADMIN — BUSPIRONE HYDROCHLORIDE 15 MG: 5 TABLET ORAL at 03:06

## 2022-11-02 RX ADMIN — Medication 10 ML: at 03:05

## 2022-11-02 RX ADMIN — MORPHINE SULFATE 1 MG: 2 INJECTION, SOLUTION INTRAMUSCULAR; INTRAVENOUS at 17:32

## 2022-11-02 RX ADMIN — Medication 3 ML: at 10:54

## 2022-11-02 RX ADMIN — KETOROLAC TROMETHAMINE 15 MG: 15 INJECTION, SOLUTION INTRAMUSCULAR; INTRAVENOUS at 20:29

## 2022-11-02 RX ADMIN — METRONIDAZOLE 500 MG: 500 INJECTION, SOLUTION INTRAVENOUS at 18:22

## 2022-11-02 RX ADMIN — FAMOTIDINE 20 MG: 10 INJECTION INTRAVENOUS at 01:01

## 2022-11-02 NOTE — PLAN OF CARE
Goal Outcome Evaluation:  Plan of Care Reviewed With: patient, spouse        Progress: no change   VSS, cath to right groin today, site is c/d/I, PPP distally, pt reports back pain that is unchanged, MDs all aware, medicated PRN as ordered, tolerating diet, , RA, IV ABX per orders, IVF, pt has no further questions or concerns, at this time, safety maintained, will continue to monitor.

## 2022-11-02 NOTE — CASE MANAGEMENT/SOCIAL WORK
Discharge Planning Assessment  [facility]     Patient Name: Dina Gonzalez  MRN: [id number]  Today's Date: 11/2/2022    Admit Date: 11/1/2022    Plan: Unable to screen patient for dc planning at this time. Patient out of room for procedure.   Discharge Needs Assessment    No documentation.                Discharge Plan     Row Name 11/02/22 1031       Plan    Plan Unable to screen patient for dc planning at this time. Patient out of room for procedure.              Continued Care and Services - Admitted Since 11/1/2022    Coordination has not been started for this encounter.          Demographic Summary    No documentation.                Functional Status    No documentation.                Psychosocial    No documentation.                Abuse/Neglect    No documentation.                Legal    No documentation.                Substance Abuse    No documentation.                Patient Forms    No documentation.                   Merlina A Fletcher, RN

## 2022-11-02 NOTE — PROGRESS NOTES
Patient seen and evaluated after midnight by Dr. Melchor.    Patient presented to the emergency department on 11/1/2022 as a transfer from [facility].  She noted that on Monday she had acute onset of chest pain with associated nausea.  She did throw up and noted having diarrhea as well.  She continued to have chest pain the following day and presented to the emergency department.  She described the chest pain as pressure in her chest that radiated to her back.  She also noted epigastric discomfort.  Work-up revealed troponin 0.445, ALT 60, AST 35, WBC 18.47.  Chest x-ray was negative for any acute findings.  CT of the abdomen and pelvis showed mild wall thickening of the transverse and sigmoid colon.  CTA of the chest showed no evidence of PE.  EKG showed sinus tach with incomplete right bundle branch block, lateral infarct present.  She was admitted for further evaluation.    Cardiology was consulted.  Troponin trended down to 0.273 and BNP 13,994. She remains on nitro drip but still complains of chest pain that she rates as 4 out of 10.  Cardiology recommended proceeding with heart cath today.    She does have a history of hyperlipidemia.  Lipid panel showed total cholesterol 148, HDL 53, LDL 74, triglycerides 115.  Atorvastatin was increased to 40 mg.    Patient has a history of hypertension and takes amlodipine at home.  Amlodipine was held this morning due to systolic blood pressure in the 90s.    CT of the abdomen and pelvis did show mild wall thickening of the transverse and sigmoid colon.  This may be related to decompressed state but also could represent an early mild acute colitis.  She has been started on Levaquin 500 mg daily and Flagyl.    Hemoglobin A1c is 5.7.  She does take glipizide 5 mg twice daily at home which was resumed.  Last blood glucose-137.    She reports feeling anxious/depressed.  Home dose of BuSpar, hydroxyzine, sertraline, trazodone have been resumed.    Patient has a  history of peripheral vascular disease for which she takes Plavix and Lipitor.  She has not followed with vascular recently.  Plavix was continued on admission and Lipitor dose was increased from 20 mg to 40 mg per cardiology.    Full dose Lovenox satisfies DVT prophylaxis    Electronically signed by KOBI Shin, 11/02/22, 12:51 PM CDT.

## 2022-11-02 NOTE — CONSULTS
"[facility] CONSULT NOTE    Referring Provider: Sonny Cortes*    Reason for Consultation: Chest Pain     Chief complaint:   Chief Complaint   Patient presents with   • NSTEMI       Subjective .     History of present illness:  Dina Gonzalez is a 52 y.o. female presents in transfer from [facility]. Patient notes Monday she had an acute onset of nausea and chest pain. She notes she threw up and had an episode of diarrhea. On Tuesday she continued to have chest pressure and nausea and made the decision to present to ER. She notes \"I think I had a heart attack Monday night.\" Her pressure was in her chest/ epigastric area but she notes it radiated through to her back. She denies fevers or chills. She has been found to have an elevated troponin which are trending down. She is a former smoker- which quit 3 years ago. She notes she has several siblings with coronary artery disease and her father. She has diabetes, hypertension and hyperlipidemia. 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 Though she notes she has not been seen by vascular in some time. EKG is abnormal with nonspecific ST changes. CTA of chest negative for pulmonary embolus and noted to have atelectasis in both lungs with emphysema. CTA of abdomen with mild wall thickening.       History  Past Medical History:   Diagnosis Date   • Anxiety and depression    • Hypertension    • Peripheral vascular disease (HCC)    ,   Past Surgical History:   Procedure Laterality Date   • APPENDECTOMY     • BACK SURGERY     •  SECTION     • CYST REMOVAL     ,   Family History   Problem Relation Age of Onset   • Diabetes Mother    • Hypertension Mother    • Kidney disease Mother    • Heart disease Mother    • Heart disease Sister    • Diabetes Sister    • Diabetes Brother    • Hypertension Brother    • Heart disease Brother    , "   Social History     Tobacco Use   • Smoking status: Former     Packs/day: 1.50     Years: 25.00     Pack years: 37.50     Types: Cigarettes   Substance Use Topics   • Alcohol use: Never   • Drug use: Never   ,     Medications    Prior to Admission medications    Medication Sig Start Date End Date Taking? Authorizing Provider   amLODIPine (NORVASC) 10 MG tablet Take 1 tablet by mouth Daily.    Gloria Suh MD   atorvastatin (LIPITOR) 20 MG tablet Take 1 tablet by mouth Daily.    Gloria Suh MD   busPIRone (BUSPAR) 15 MG tablet Take 1 tablet by mouth 2 (Two) Times a Day.    Gloria Suh MD   clopidogrel (PLAVIX) 75 MG tablet Take 1 tablet by mouth Daily.    Gloria Suh MD   cyclobenzaprine (FLEXERIL) 5 MG tablet Take 1 tablet by mouth Every Night.    Gloria Shu MD   famotidine (PEPCID) 20 MG tablet Take 1 tablet by mouth 2 (Two) Times a Day.    Gloria Suh MD   hydrOXYzine (ATARAX) 50 MG tablet Take 1 tablet by mouth Daily.    Gloria Suh MD   traZODone (DESYREL) 50 MG tablet Take 1 tablet by mouth Every Night.    Gloria Suh MD   vitamin D3 125 MCG (5000 UT) capsule capsule Take 1 capsule by mouth Daily.    Gloria Suh MD       Current Facility-Administered Medications   Medication Dose Route Frequency Provider Last Rate Last Admin   • acetaminophen (TYLENOL) tablet 650 mg  650 mg Oral Q4H PRN Angela Huynh APRN   650 mg at 11/02/22 0240    Or   • acetaminophen (TYLENOL) 160 MG/5ML solution 650 mg  650 mg Oral Q4H PRN Angela Huynh, APRJEAN        Or   • acetaminophen (TYLENOL) suppository 650 mg  650 mg Rectal Q4H PRN Angela Huynh, APRN       • amLODIPine (NORVASC) tablet 10 mg  10 mg Oral Daily Angela Huynh APRN       • atorvastatin (LIPITOR) tablet 20 mg  20 mg Oral Daily Angela Huynh, APRJEAN       • busPIRone (BUSPAR) tablet 15 mg  15 mg Oral BID Angela Huynh APRN   15 mg at 11/02/22 0306   •  clopidogrel (PLAVIX) tablet 75 mg  75 mg Oral Daily Angela Huynh APRN       • Enoxaparin Sodium (LOVENOX) syringe 80 mg  1 mg/kg Subcutaneous Q12H Angela Huynh APRN       • famotidine (PEPCID) injection 20 mg  20 mg Intravenous Q12H Angela Huynh APRN   20 mg at 11/02/22 0841   • influenza vac split quad (FLUZONE,FLUARIX,AFLURIA,FLULAVAL) injection 0.5 mL  0.5 mL Intramuscular During Hospitalization Al Melchor DO       • nitroglycerin (TRIDIL) 200 mcg/ml infusion  5-200 mcg/min Intravenous Titrated Amaury Hernandez MD 4.5 mL/hr at 11/02/22 0135 15 mcg/min at 11/02/22 0135   • ondansetron (ZOFRAN) tablet 4 mg  4 mg Oral Q6H PRN Angela Huynh APRN        Or   • ondansetron (ZOFRAN) injection 4 mg  4 mg Intravenous Q6H PRN Angela Huynh APRN       • sodium chloride 0.9 % flush 10 mL  10 mL Intravenous Q12H Angela Huynh APRN   10 mL at 11/02/22 0305   • sodium chloride 0.9 % flush 10 mL  10 mL Intravenous PRN Angela Huynh APRN       • traZODone (DESYREL) tablet 50 mg  50 mg Oral Nightly Angela Huynh APRN   50 mg at 11/02/22 0306       Allergies:  Penicillins and Betadine [povidone iodine]    Review of Systems  Review of Systems   Constitutional: Positive for malaise/fatigue. Negative for chills, decreased appetite, fever, weight gain and weight loss.   HENT: Negative for nosebleeds.    Eyes: Negative for visual disturbance.   Cardiovascular: Positive for chest pain and dyspnea on exertion. Negative for leg swelling, near-syncope, orthopnea, palpitations, paroxysmal nocturnal dyspnea and syncope.   Respiratory: Positive for shortness of breath. Negative for cough, hemoptysis and snoring.    Endocrine: Negative for cold intolerance and heat intolerance.   Hematologic/Lymphatic: Negative for bleeding problem. Does not bruise/bleed easily.   Skin: Negative for rash.   Musculoskeletal: Negative for back pain and falls.   Gastrointestinal: Positive for abdominal pain, nausea  "and vomiting. Negative for constipation, diarrhea, heartburn and melena.   Genitourinary: Negative for hematuria.   Neurological: Negative for dizziness, headaches and light-headedness.   Psychiatric/Behavioral: Negative for altered mental status.   Allergic/Immunologic: Negative for persistent infections.       Objective     Physical Exam:  Patient Vitals for the past 24 hrs:   BP Temp Temp src Pulse Resp SpO2 Height Weight   11/02/22 0742 91/53 100 °F (37.8 °C) Oral 98 18 96 % -- --   11/02/22 0500 110/50 -- -- 100 18 97 % -- --   11/02/22 0224 114/61 98.1 °F (36.7 °C) Oral 95 18 95 % -- 68.9 kg (152 lb)   11/02/22 0128 -- 99.6 °F (37.6 °C) Oral 94 -- 96 % -- --   11/02/22 0046 116/72 -- -- 100 -- 95 % -- --   11/01/22 2246 113/68 -- -- 101 -- 95 % -- --   11/01/22 2231 117/74 -- -- 103 -- 96 % -- --   11/01/22 2116 115/70 99 °F (37.2 °C) -- 105 -- 95 % -- --   11/01/22 2105 126/75 -- -- 103 -- -- -- --   11/01/22 2035 115/71 -- -- 105 18 93 % 160 cm (63\") 75.9 kg (167 lb 4.8 oz)     Constitutional:       Appearance: Healthy appearance. Not in distress.   Eyes:      Pupils: Pupils are equal, round, and reactive to light.   HENT:      Head: Normocephalic and atraumatic.      Nose: Nose normal.    Mouth/Throat:      Dentition: Normal.   Pulmonary:      Effort: Pulmonary effort is normal.      Breath sounds: Normal breath sounds.   Chest:      Chest wall: Not tender to palpatation.   Cardiovascular:      PMI at left midclavicular line. Normal rate. Regular rhythm.      Murmurs: There is no murmur.   Pulses:     Intact distal pulses.   Edema:     Peripheral edema absent.   Abdominal:      General: Bowel sounds are normal.      Palpations: Abdomen is soft.   Musculoskeletal: Normal range of motion.      Cervical back: Normal range of motion. Skin:     General: Skin is warm and dry.   Neurological:      Mental Status: Alert, oriented to person, place, and time and oriented to person, place and time.   Psychiatric:       "   Attention and Perception: Attention normal.         Mood and Affect: Mood normal.         Results Review:   I reviewed the patient's new clinical results.    Lab Results (last 72 hours)     Procedure Component Value Units Date/Time    Lipid Panel [[id number]] Collected: 11/02/22 0519    Specimen: Blood Updated: 11/02/22 0608     Total Cholesterol 148 mg/dL      Triglycerides 115 mg/dL      HDL Cholesterol 53 mg/dL      LDL Cholesterol  74 mg/dL      VLDL Cholesterol 21 mg/dL      LDL/HDL Ratio 1.36    Narrative:      Cholesterol Reference Ranges  (U.S. Department of Health and Human Services ATP III Classifications)    Desirable          <200 mg/dL  Borderline High    200-239 mg/dL  High Risk          >240 mg/dL      Triglyceride Reference Ranges  (U.S. Department of Health and Human Services ATP III Classifications)    Normal           <150 mg/dL  Borderline High  150-199 mg/dL  High             200-499 mg/dL  Very High        >500 mg/dL    HDL Reference Ranges  (U.S. Department of Health and Human Services ATP III Classifications)    Low     <40 mg/dl (major risk factor for CHD)  High    >60 mg/dl ('negative' risk factor for CHD)        LDL Reference Ranges  (U.S. Department of Health and Human Services ATP III Classifications)    Optimal          <100 mg/dL  Near Optimal     100-129 mg/dL  Borderline High  130-159 mg/dL  High             160-189 mg/dL  Very High        >189 mg/dL    Comprehensive Metabolic Panel [[id number]]  (Abnormal) Collected: 11/02/22 0519    Specimen: Blood Updated: 11/02/22 0608     Glucose 137 mg/dL      BUN 13 mg/dL      Creatinine 0.64 mg/dL      Sodium 139 mmol/L      Potassium 4.3 mmol/L      Chloride 105 mmol/L      CO2 23.0 mmol/L      Calcium 8.6 mg/dL      Total Protein 6.6 g/dL      Albumin 3.90 g/dL      ALT (SGPT) 50 U/L      AST (SGOT) 28 U/L      Alkaline Phosphatase 172 U/L      Total Bilirubin 0.4 mg/dL      Globulin 2.7 gm/dL      A/G Ratio 1.4 g/dL      BUN/Creatinine  Ratio 20.3     Anion Gap 11.0 mmol/L      eGFR 106.5 mL/min/1.73      Comment: National Kidney Foundation and American Society of Nephrology (ASN) Task Force recommended calculation based on the Chronic Kidney Disease Epidemiology Collaboration (CKD-EPI) equation refit without adjustment for race.       Narrative:      GFR Normal >60  Chronic Kidney Disease <60  Kidney Failure <15      Hemoglobin A1c [[id number]]  (Abnormal) Collected: 11/02/22 0519    Specimen: Blood Updated: 11/02/22 0605     Hemoglobin A1C 5.70 %     Narrative:      Hemoglobin A1C Ranges:    Increased Risk for Diabetes  5.7% to 6.4%  Diabetes                     >= 6.5%  Diabetic Goal                < 7.0%    Troponin [[id number]]  (Abnormal) Collected: 11/02/22 0519    Specimen: Blood Updated: 11/02/22 0558     Troponin T 0.273 ng/mL     Narrative:      Troponin T Reference Range:  <= 0.03 ng/mL-   Negative for AMI  >0.03 ng/mL-     Abnormal for myocardial necrosis.  Clinicians would have to utilize clinical acumen, EKG, Troponin and serial changes to determine if it is an Acute Myocardial Infarction or myocardial injury due to an underlying chronic condition.       Results may be falsely decreased if patient taking Biotin.      CBC Auto Differential [[id number]]  (Abnormal) Collected: 11/02/22 0519    Specimen: Blood Updated: 11/02/22 0541     WBC 11.93 10*3/mm3      RBC 3.75 10*6/mm3      Hemoglobin 11.2 g/dL      Hematocrit 34.3 %      MCV 91.5 fL      MCH 29.9 pg      MCHC 32.7 g/dL      RDW 12.6 %      RDW-SD 41.9 fl      MPV 9.5 fL      Platelets 315 10*3/mm3      Neutrophil % 71.9 %      Lymphocyte % 14.9 %      Monocyte % 12.5 %      Eosinophil % 0.0 %      Basophil % 0.3 %      Immature Grans % 0.4 %      Neutrophils, Absolute 8.57 10*3/mm3      Lymphocytes, Absolute 1.78 10*3/mm3      Monocytes, Absolute 1.49 10*3/mm3      Eosinophils, Absolute 0.00 10*3/mm3      Basophils, Absolute 0.04 10*3/mm3      Immature Grans, Absolute 0.05  10*3/mm3      nRBC 0.0 /100 WBC     Magnesium [[id number]]  (Normal) Collected: 11/01/22 2238    Specimen: Blood Updated: 11/02/22 0122     Magnesium 1.7 mg/dL     [place] [facility] [[id number]] Collected: 11/01/22 2040    Specimen: Blood Updated: 11/01/22 2346    Narrative:      The following orders were created for panel order [place] [facility].  Procedure                               Abnormality         Status                     ---------                               -----------         ------                     Green Top (Gel)[[id number]]                                  Final result               Lavender Top[[id number]]                                     Final result               Red Top[[id number]]                                          Final result               Light Blue Top[[id number]]                                   Final result                 Please view results for these tests on the individual orders.    Red Top [[id number]] Collected: 11/01/22 2238    Specimen: Blood Updated: 11/01/22 2346     Extra Tube Hold for add-ons.     Comment: Auto resulted.       Troponin [[id number]]  (Abnormal) Collected: 11/01/22 2238    Specimen: Blood Updated: 11/01/22 2309     Troponin T 0.393 ng/mL     Narrative:      Troponin T Reference Range:  <= 0.03 ng/mL-   Negative for AMI  >0.03 ng/mL-     Abnormal for myocardial necrosis.  Clinicians would have to utilize clinical acumen, EKG, Troponin and serial changes to determine if it is an Acute Myocardial Infarction or myocardial injury due to an underlying chronic condition.       Results may be falsely decreased if patient taking Biotin.      Lipase [[id number]]  (Abnormal) Collected: 11/01/22 2040    Specimen: Blood Updated: 11/01/22 2201     Lipase 11 U/L     COVID PRE-OP / PRE-PROCEDURE SCREENING ORDER (NO ISOLATION) - Swab, Nasopharynx [[id number]]  (Normal) Collected: 11/01/22 2055    Specimen: Swab from Nasopharynx Updated: 11/01/22 2147    Narrative:      The following  orders were created for panel order COVID PRE-OP / PRE-PROCEDURE SCREENING ORDER (NO ISOLATION) - Swab, Nasopharynx.  Procedure                               Abnormality         Status                     ---------                               -----------         ------                     COVID-19,CEPHEID/ALBINO,CO...[[id number]]  Normal              Final result                 Please view results for these tests on the individual orders.    COVID-19,CEPHEID/ALBINO,COR/CRESENCIO/PAD/KIRT/MAD IN-HOUSE(OR EMERGENT/ADD-ON),NP SWAB IN TRANSPORT MEDIA 3-4 HR TAT, RT-PCR - Swab, Nasopharynx [[id number]]  (Normal) Collected: 11/01/22 2055    Specimen: Swab from Nasopharynx Updated: 11/01/22 2147     COVID19 Not Detected    Narrative:      Fact sheet for providers: https://www.fda.gov/media/865367/download     Fact sheet for patients: https://www.fda.gov/media/434263/download  Fact sheet for providers: https://www.fda.gov/media/082875/download     Fact sheet for patients: https://www.fda.gov/media/705218/download    Protime-INR [[id number]]  (Abnormal) Collected: 11/01/22 2040    Specimen: Blood Updated: 11/01/22 2145     Protime 14.2 Seconds      INR 1.15    aPTT [[id number]]  (Abnormal) Collected: 11/01/22 2040    Specimen: Blood Updated: 11/01/22 2145     PTT 68.7 seconds     Green Top (Gel) [[id number]] Collected: 11/01/22 2040    Specimen: Blood Updated: 11/01/22 2145     Extra Tube Hold for add-ons.     Comment: Auto resulted.       Lavender Top [[id number]] Collected: 11/01/22 2040    Specimen: Blood Updated: 11/01/22 2145     Extra Tube hold for add-on     Comment: Auto resulted       Light Blue Top [[id number]] Collected: 11/01/22 2040    Specimen: Blood Updated: 11/01/22 2145     Extra Tube Hold for add-ons.     Comment: Auto resulted       Troponin [[id number]]  (Abnormal) Collected: 11/01/22 2040    Specimen: Blood Updated: 11/01/22 2140     Troponin T 0.445 ng/mL     Narrative:      Troponin T Reference Range:  <= 0.03 ng/mL-    Negative for AMI  >0.03 ng/mL-     Abnormal for myocardial necrosis.  Clinicians would have to utilize clinical acumen, EKG, Troponin and serial changes to determine if it is an Acute Myocardial Infarction or myocardial injury due to an underlying chronic condition.       Results may be falsely decreased if patient taking Biotin.      Comprehensive Metabolic Panel [[id number]]  (Abnormal) Collected: 11/01/22 2040    Specimen: Blood Updated: 11/01/22 2139     Glucose 140 mg/dL      BUN 13 mg/dL      Creatinine 0.53 mg/dL      Sodium 139 mmol/L      Potassium 3.1 mmol/L      Chloride 103 mmol/L      CO2 22.0 mmol/L      Calcium 8.9 mg/dL      Total Protein 6.9 g/dL      Albumin 4.10 g/dL      ALT (SGPT) 60 U/L      AST (SGOT) 35 U/L      Alkaline Phosphatase 199 U/L      Total Bilirubin 0.3 mg/dL      Globulin 2.8 gm/dL      A/G Ratio 1.5 g/dL      BUN/Creatinine Ratio 24.5     Anion Gap 14.0 mmol/L      eGFR 111.4 mL/min/1.73      Comment: National Kidney Foundation and American Society of Nephrology (ASN) Task Force recommended calculation based on the Chronic Kidney Disease Epidemiology Collaboration (CKD-EPI) equation refit without adjustment for race.       Narrative:      GFR Normal >60  Chronic Kidney Disease <60  Kidney Failure <15      CBC & Differential [[id number]]  (Abnormal) Collected: 11/01/22 2040    Specimen: Blood Updated: 11/01/22 2114    Narrative:      The following orders were created for panel order CBC & Differential.  Procedure                               Abnormality         Status                     ---------                               -----------         ------                     CBC Auto Differential[[id number]]        Abnormal            Final result                 Please view results for these tests on the individual orders.    CBC Auto Differential [[id number]]  (Abnormal) Collected: 11/01/22 2040    Specimen: Blood Updated: 11/01/22 2114     WBC 18.47 10*3/mm3      RBC 3.95 10*6/mm3       Hemoglobin 11.9 g/dL      Hematocrit 35.9 %      MCV 90.9 fL      MCH 30.1 pg      MCHC 33.1 g/dL      RDW 12.6 %      RDW-SD 41.7 fl      MPV 9.7 fL      Platelets 377 10*3/mm3      Neutrophil % 85.5 %      Lymphocyte % 5.5 %      Monocyte % 8.1 %      Eosinophil % 0.0 %      Basophil % 0.2 %      Immature Grans % 0.7 %      Neutrophils, Absolute 15.78 10*3/mm3      Lymphocytes, Absolute 1.02 10*3/mm3      Monocytes, Absolute 1.50 10*3/mm3      Eosinophils, Absolute 0.00 10*3/mm3      Basophils, Absolute 0.04 10*3/mm3      Immature Grans, Absolute 0.13 10*3/mm3      nRBC 0.0 /100 WBC           No results found for: ECHOEFEST    Imaging Results (Last 72 Hours)     Procedure Component Value Units Date/Time    CT Abdomen Pelvis With Contrast [[id number]] Collected: 11/02/22 0442     Updated: 11/02/22 0450    Narrative:      EXAM/TECHNIQUE: CT abdomen pelvis with IV contrast     INDICATION: chest pain, epigastric pain, vomiting, diarrhea, elevated  troponin     COMPARISON: None available.     DLP: 272 mGy cm. Automated exposure control was also utilized to  decrease patient radiation dose.     FINDINGS:     Lung bases are described in a separate same-day report.     No suspicious focal liver lesion. The gallbladder is surgically absent.  No biliary ductal dilatation. Pancreas appears normal. Spleen is  unremarkable. Adrenal glands appear normal.     No solid renal mass. No urolithiasis or hydronephrosis. No focal urinary  bladder wall thickening.      Sigmoid diverticulosis. There is a long segment of apparent sigmoid  colon wall thickening on axial image 75, although this may be secondary  to decompressed state. No definite pericolonic fat stranding. There is  also apparent wall thickening of the transverse colon which is also  decompressed. The appendix appears normal. No abnormal small bowel  distention or evidence of active small bowel inflammation.     No ascites or free pelvic fluid. Uterus and adnexa appear  unremarkable.  No pelvic mass or pelvic collection.     Atherosclerotic nonaneurysmal abdominal aorta. No abdominal or pelvic  adenopathy.      No acute soft tissue finding. Multilevel lumbar spine degenerative  change, greatest at L5-S1. No acute osseous finding.       Impression:         Mild wall thickening of the transverse and sigmoid colon. This may be  related to decompressed state but also could represent an early mild  acute colitis. No other acute abdominopelvic findings.     These findings are in agreement with the critical and emergent findings  from the [facility] preliminary report.     This report was finalized on 11/02/2022 04:47 by Dr. Paul Stevens MD.    CT Angiogram Chest [[id number]] Collected: 11/02/22 0435     Updated: 11/02/22 0443    Narrative:      EXAM/TECHNIQUE: CT chest angiography with 3D MIP images with IV contrast     INDICATION: chest pain, epigastric pain, vomiting, diarrhea, elevated  troponin     COMPARISON: None available.     DLP: 218 mGy cm. Automated exposure control was also utilized to  decrease patient radiation dose.     FINDINGS:     No evidence of pulmonary embolus. The main pulmonary artery is  nondilated. Thoracic aorta is normal in caliber and contains  atherosclerotic calcification. Trace pericardial fluid. Mild coronary  artery atherosclerotic calcification.      The central airways are clear. No pleural effusion. Atelectasis is  present in both posterior lower lungs. No definite consolidation.  Moderate centrilobular emphysema. No noncalcified pulmonary nodule or  mass identified. No enlarged lymph nodes in the chest.      No acute soft tissue finding. Upper abdomen is described in a separate  same-day report. No suspicious osseous finding.       Impression:         1.  No evidence of pulmonary embolus.      2.  Atelectasis in both posterior lower lungs.     3.  Moderate emphysema.     These findings are in agreement with the critical and emergent findings  from the  [facility] preliminary report.  This report was finalized on 11/02/2022 04:40 by Dr. Paul Stevens MD.    XR Chest 1 View [[id number]] Collected: 11/01/22 2108     Updated: 11/01/22 2112    Narrative:      EXAMINATION:  XR CHEST 1 VW-  11/1/2022 9:03 PM CDT     HISTORY: Chest pain.     COMPARISON: 02/21/2006.     TECHNIQUE: Single view AP image.     FINDINGS:  There is hypoventilation with vascular crowding. There is  mild atelectasis in the lung bases. There is mild stable bronchial wall  thickening. The heart is normal in size. No acute bony abnormality is  seen.       Impression:      1. Hypoventilation with vascular crowding. Mild atelectasis in the lung  bases.  2. Stable bronchial wall thickening.        This report was finalized on 11/01/2022 21:09 by Dr. Dariel Jaime MD.                Assessment & Plan       Elevated troponin    Chest pain, atypical, epigastric and with deep breathing    NSTEMI (non-ST elevated myocardial infarction) (HCC)    Hypokalemia    Transaminitis    Anxiety disorder    Leukocytosis    Long term current use of antithrombotics/antiplatelets    Plan:  NSTEMI - troponin trending down- which is suspected given severe symptoms Monday night. Strong family history of CAD with several siblings and father. Quit smoking 3 years ago. Diabetic and Hyperlipidemia. Pain is improved with nitro drip but still present. Will plan for heart cath today. NPO     Hyperlipidemia - Recommend high intensity statin. Increase Lipitor.     Hypertension - blood pressure stable.     Type II DM - management per primary team     Peripheral Vascular Disease - notes left upper arm arthrosclerosis. Treated with Plavix and Statin. Has not followed with vascular in some time.     Further orders per Dr. Yan     Thank you for asking us to follow this patient with you.       Electronically signed by KOBI Sepulveda, 11/02/22, 9:34 AM CDT.

## 2022-11-02 NOTE — PAYOR COMM NOTE
"  11/2/22 [facility] [phone]    [phone]      ER ADMIT TO INPATIENT ON 11/1/22 INPATIENT ORDER.        Dina Pedersen (52 y.o. Female)     Date of Birth   1970    Social Security Number       Address   [address] [place] [address]    Home Phone   [phone]    MRN   [id number]       [place]   Other    Marital Status                               Admission Date   11/1/22    Admission Type   Emergency    Admitting Provider   Alf Diehl DO    Attending Provider   Alf Diehl DO    Department, Room/Bed   [address], 445/1       Discharge Date       Discharge Disposition       Discharge Destination                               Attending Provider: Alf Diehl DO    Allergies: Penicillins, Betadine [Povidone Iodine]    Isolation: None   Infection: None   Code Status: CPR    Ht: 160 cm (63\")   Wt: 68.9 kg (152 lb)    Admission Cmt: None   Principal Problem: Elevated troponin [R77.8]                 Active Insurance as of 11/1/2022     Primary Coverage     Payor Plan Insurance Group Employer/Plan Group    [employer] BLUE CROSS [employer] BLUE SHIELD PPO [id number]     Payor Plan Address Payor Plan Phone Number Payor Plan Fax Number Effective Dates    [address] [phone]  1/1/2020 - None Entered    [place] [address]       Subscriber Name Subscriber Birth Date Member ID       DINA PEDERSEN 1970 [id number]                 Emergency Contacts      (Rel.) Home Phone Work Phone Mobile Phone    Jah Pedersen (Spouse) [phone] -- --         [facility] Encounter Date/Time: 11/1/2022 2025   Hospital Account: [id number]    MRN: [id number]   Patient:  Dina Pedersen   Contact Serial #: [id number]   SSN:          ENCOUNTER             Patient Class: Inpatient   Unit: [address] Service: Medicine     Bed: 445/1   Admitting Provider: Alf Diehl DO   Referring " Physician: Sonny Hager*   Attending Provider: Alf Diehl DO Adm Diagnosis: Elevated troponin [R77.8]               PATIENT          Name: Dina Pedersen : 1970 (52 yrs)   Address: [address] Sex: Female   City: [place] [id number]   County: [place]   Marital Status:  Ethnicity: NOT                                                                              Race: WHITE   Primary Care Provider: Carmencita Quintanilla APRN Patients Phone: Home Phone: [phone]         EMERGENCY CONTACT   Contact Name Legal Guardian? Relationship to Patient Home Phone Work Phone   1. CarlosJah  2. *No Contact Specified*      Spouse    [phone]              GUARANTOR            Guarantor: Dina Pedersen     : 1970   Address: [address] Sex: Female     CAMRYN [facility]     Relation to Patient: Self       Home Phone: [phone]   Guarantor ID: [id number]       Work Phone:     GUARANTOR EMPLOYER   Employer: [employer]         Status: FULL TIME   COVERAGE          PRIMARY INSURANCE   Payor: [employer] Plan: [employer] BLUE CROSS BLUE SHIELD PPO   Group Number: [id number] Insurance Type: INDEMNITY   Subscriber Name: DINA PEDERSEN Subscriber : 1970   Subscriber ID: [id number] Coverage Address: [place] [address]   Pat. Rel. to Subscriber: Self Coverage Phone: [phone]   SECONDARY INSURANCE   Payor: N/A Plan: N/A   Group Number:   Insurance Type:     Subscriber Name:   Subscriber :     Subscriber ID:   Coverage Address:     Pat. Rel. to Subscriber:   Coverage Phone:        Contact Serial # ([id number])       2022    Chart ID ([id number])              Angela Huynh APRN   Nurse Practitioner  Hospitalist  H&P      Cosign Needed  Date of Service:  22  Creation Time:  22     Cosign Needed        [facility]  "Services  HISTORY AND PHYSICAL     Date of Admission: 11/1/2022  Primary Care Physician: Carmencita Quintanilla APRN     Subjective      Chief Complaint: Transfer for higher level of care     History of Present Illness  Dina Gonzalez is a 52-year-old female with a past medical history of anxiety/depression, peripheral vascular disease, hypertension.  Patient presented to [facility] emergency department, with complaints of nausea and vomiting that started approximately midnight.  She states she developed mid epigastric pain today about 5 AM.  She denies having chest pain.  Work-up at [facility] did reveal elevated troponins and EKG that they felt was abnormal therefore she was transferred to [facility] emergency department.  Initial troponin 0.445, repeat 0.393.  Other findings potassium 3.1, transaminitis and white count 18.47.  [facility] White count 21.9 with a left shift.  Patient did receive heparin drip, Pepcid IV and aspirin 324 mg at 1902.  Currently nitroglycerin drip is in use and she continues to deny chest pain.  She states epigastric pain has resolved.  She does report severe pain with deep breath.  She states she has not had a recent illness however she did have diaphoresis and chills last evening.  She reports feeling to be able to check her temperature.  She has no other complaints.  Saturation is 97% on 2 L.  She is admitted for further evaluation treatment     Review of Systems   A 10 point review of system was completed, all negative except for those discussed in HPI                    Past Medical History:   Diagnosis Date   • Anxiety and depression     • Hypertension     • Peripheral vascular disease (HCC)               /72   Pulse 100   Temp 99 °F (37.2 °C)   Resp 18   Ht 160 cm (63\")   Wt 75.9 kg (167 lb 4.8 oz)   SpO2 95%   BMI 29.64 kg/m²   Physical Exam  Vitals reviewed.   Constitutional:       Appearance: She is ill-appearing. "   HENT:      Head: Normocephalic and atraumatic.      Mouth/Throat:      Mouth: Mucous membranes are dry.      Pharynx: Oropharynx is clear.   Eyes:      Extraocular Movements: Extraocular movements intact.      Conjunctiva/sclera: Conjunctivae normal.   Cardiovascular:      Rate and Rhythm: Regular rhythm. Tachycardia present.   Pulmonary:      Breath sounds: Normal breath sounds.      Comments: Shallow respirations due to pain with deep breathing  Abdominal:      General: There is no distension.      Palpations: Abdomen is soft.      Tenderness: There is no abdominal tenderness ( No epigastric tenderness on palpation).   Musculoskeletal:         General: Normal range of motion.      Cervical back: Normal range of motion and neck supple.      Right lower leg: No edema.      Left lower leg: No edema.   Skin:     General: Skin is warm and dry.   Neurological:      General: No focal deficit present.      Mental Status: She is alert and oriented to person, place, and time.   Psychiatric:         Behavior: Behavior normal.      Comments: Situational anxiety and with a history of anxiety disorder          Pertinent Data:   Lab Results (last 72 hours)      Procedure Component Value Units Date/Time     Troponin [[id number]]  (Abnormal) Collected: 11/01/22 2238     Specimen: Blood Updated: 11/01/22 2309       Troponin T 0.393 ng/mL       Lipase [[id number]]  (Abnormal) Collected: 11/01/22 2040     Specimen: Blood Updated: 11/01/22 2201       Lipase 11 U/L       COVID-19,CEPHEID/ALBINO,COR/CRESENCIO/PAD/KIRT/MAD IN-HOUSE(OR EMERGENT/ADD-ON),NP SWAB IN TRANSPORT MEDIA 3-4 HR TAT, RT-PCR - Swab, Nasopharynx [[id number]]  (Normal) Collected: 11/01/22 2055     Specimen: Swab from Nasopharynx Updated: 11/01/22 2147       COVID19 Not Detected     Protime-INR [[id number]]  (Abnormal) Collected: 11/01/22 2040     Specimen: Blood Updated: 11/01/22 2145       Protime 14.2 Seconds         INR 1.15     aPTT [[id number]]  (Abnormal) Collected:  11/01/22 2040     Specimen: Blood Updated: 11/01/22 2145       PTT 68.7 seconds       Troponin [[id number]]  (Abnormal) Collected: 11/01/22 2040     Specimen: Blood Updated: 11/01/22 2140       Troponin T 0.445 ng/mL       Comprehensive Metabolic Panel [[id number]]  (Abnormal) Collected: 11/01/22 2040     Specimen: Blood Updated: 11/01/22 2139       Glucose 140 mg/dL         BUN 13 mg/dL         Creatinine 0.53 mg/dL         Sodium 139 mmol/L         Potassium 3.1 mmol/L         Chloride 103 mmol/L         CO2 22.0 mmol/L         Calcium 8.9 mg/dL         Total Protein 6.9 g/dL         Albumin 4.10 g/dL         ALT (SGPT) 60 U/L         AST (SGOT) 35 U/L         Alkaline Phosphatase 199 U/L         Total Bilirubin 0.3 mg/dL         Globulin 2.8 gm/dL         A/G Ratio 1.5 g/dL         BUN/Creatinine Ratio 24.5       Anion Gap 14.0 mmol/L         eGFR 111.4 mL/min/1.73       CBC Auto Differential [[id number]]  (Abnormal) Collected: 11/01/22 2040     Specimen: Blood Updated: 11/01/22 2114       WBC 18.47 10*3/mm3         RBC 3.95 10*6/mm3         Hemoglobin 11.9 g/dL         Hematocrit 35.9 %         MCV 90.9 fL         MCH 30.1 pg         MCHC 33.1 g/dL         RDW 12.6 %         RDW-SD 41.7 fl         MPV 9.7 fL         Platelets 377 10*3/mm3         Neutrophil % 85.5 %         Lymphocyte % 5.5 %         Monocyte % 8.1 %         Eosinophil % 0.0 %         Basophil % 0.2 %         Immature Grans % 0.7 %         Neutrophils, Absolute 15.78 10*3/mm3         Lymphocytes, Absolute 1.02 10*3/mm3         Monocytes, Absolute 1.50 10*3/mm3         Eosinophils, Absolute 0.00 10*3/mm3         Basophils, Absolute 0.04 10*3/mm3         Immature Grans, Absolute 0.13 10*3/mm3         nRBC 0.0 /100 WBC                     Imaging Results (Last 24 Hours)      Procedure Component Value Units Date/Time     CT Angiogram Chest [[id number]] Resulted: 11/01/22 2207       Updated: 11/01/22 2220     CT Abdomen Pelvis With Contrast [[id number]]  Resulted: 11/01/22 2207       Updated: 11/01/22 2220     XR Chest 1 View [[id number]] Collected: 11/01/22 2108       Updated: 11/01/22 2112     Narrative:       EXAMINATION:  XR CHEST 1 VW-  11/1/2022 9:03 PM CDT     HISTORY: Chest pain.     COMPARISON: 02/21/2006.     TECHNIQUE: Single view AP image.     FINDINGS:  There is hypoventilation with vascular crowding. There is  mild atelectasis in the lung bases. There is mild stable bronchial wall  thickening. The heart is normal in size. No acute bony abnormality is  seen.        Impression:       1. Hypoventilation with vascular crowding. Mild atelectasis in the lung  bases.  2. Stable bronchial wall thickening.        This report was finalized on 11/01/2022 21:09 by Dr. Draiel Jaime MD.          Obtained at [facility] facility  WBC 21.9, potassium 3.2, elevated liver function studies, lipase 44, CK2 74, CK-MB 16.9, troponin three 533.2  Drug screen positive for oxycodone, TCA and THC  Urinalysis, flu A/B and COVID-negative     Assessment / Plan      Assessment:        Active Hospital Problems     Diagnosis     • **Elevated troponin     • Chest pain, atypical, epigastric and with deep breathing     • NSTEMI (non-ST elevated myocardial infarction) (HCC)     • Hypokalemia     • Transaminitis     • Anxiety disorder     • Leukocytosis     • Long term current use of antithrombotics/antiplatelets           Plan:   1.  Admit as inpatient  2.  Consult cardiology in a.m.  3.  N.p.o. for possible intervention in a.m.  4.  Serial troponins and EKGs  5.  Replace potassium  6.  GI cocktail  7.  Home medications reviewed and restarted as appropriate  8.  Continue full dose Lovenox for now  9.  Continue Tridil drip  10.  Supplemental oxygen as needed, incentive spirometry, continuous pulse oximetry, cardiac monitoring  11.  Labs in a.m., stat magnesium     I discussed the patient's findings and my recommendations with: Al Melchor DO     Time spent: 40 minutes     Patient seen and  "examined by me on 11/2/2022 at 12:09 AM.     Electronically signed by KOBI Gutiérrez, 11/02/22, 00:56 CDT         Tony Hauser APRN   Nurse Practitioner  Cardiology  Consults      Cosign Needed  Date of Service:  11/02/22 0933  Creation Time:  11/02/22 0933  Consult Orders   Inpatient Cardiology Consult [[id number]] ordered by Angela Huynh APRN at 11/02/22 0038          Cosign Needed        [facility] CONS[facility]     Referring Provider: Sonny Cortes*     Reason for Consultation: Chest Pain      Chief complaint:       Chief Complaint   Patient presents with   • NSTEMI            Subjective    .      History of present illness:  Dina Gonzalez is a 52 y.o. female presents in transfer from [facility]. Patient notes Monday she had an acute onset of nausea and chest pain. She notes she threw up and had an episode of diarrhea. On Tuesday she continued to have chest pressure and nausea and made the decision to present to ER. She notes \"I think I had a heart attack Monday night.\" Her pressure was in her chest/ epigastric area but she notes it radiated through to her back. She denies fevers or chills. She has been found to have an elevated troponin which are trending down. She is a former smoker- which quit 3 years ago. She notes she has several siblings with coronary artery disease and her father. She has diabetes, hypertension and hyperlipidemia. 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 Though she notes she has not been seen by vascular in some time. EKG is abnormal with nonspecific ST changes. CTA of chest negative for pulmonary embolus and noted to have atelectasis in both lungs with emphysema. CTA of abdomen with mild wall thickening.         History  Medical History        Past Medical History:   Diagnosis Date   • Anxiety and depression "     • Hypertension     • Peripheral vascular disease (HCC)        ,               Allergies:  Penicillins and Betadine [povidone iodine]     Review of Systems  Review of Systems   Constitutional: Positive for malaise/fatigue. Negative for chills, decreased appetite, fever, weight gain and weight loss.   HENT: Negative for nosebleeds.    Eyes: Negative for visual disturbance.   Cardiovascular: Positive for chest pain and dyspnea on exertion. Negative for leg swelling, near-syncope, orthopnea, palpitations, paroxysmal nocturnal dyspnea and syncope.   Respiratory: Positive for shortness of breath. Negative for cough, hemoptysis and snoring.    Endocrine: Negative for cold intolerance and heat intolerance.   Hematologic/Lymphatic: Negative for bleeding problem. Does not bruise/bleed easily.   Skin: Negative for rash.   Musculoskeletal: Negative for back pain and falls.   Gastrointestinal: Positive for abdominal pain, nausea and vomiting. Negative for constipation, diarrhea, heartburn and melena.   Genitourinary: Negative for hematuria.   Neurological: Negative for dizziness, headaches and light-headedness.   Psychiatric/Behavioral: Negative for altered mental status.   Allergic/Immunologic: Negative for persistent infections.            11/02/22 0742 91/53 100 °F (37.8 °C) Oral 98 18 96 % -- --   11/02/22 0500 110/50 -- -- 100 18 97 % -- --   11/02/22 0224 114/61 98.1 °F (36.7 °C) Oral 95 18 95 % -- 68.9 kg (152 lb)     Constitutional:       Appearance: Healthy appearance. Not in distress.   Eyes:      Pupils: Pupils are equal, round, and reactive to light.   HENT:      Head: Normocephalic and atraumatic.      Nose: Nose normal.    Mouth/Throat:      Dentition: Normal.   Pulmonary:      Effort: Pulmonary effort is normal.      Breath sounds: Normal breath sounds.   Chest:      Chest wall: Not tender to palpatation.   Cardiovascular:      PMI at left midclavicular line. Normal rate. Regular rhythm.      Murmurs: There is  no murmur.   Pulses:     Intact distal pulses.   Edema:     Peripheral edema absent.   Abdominal:      General: Bowel sounds are normal.      Palpations: Abdomen is soft.   Musculoskeletal: Normal range of motion.      Cervical back: Normal range of motion. Skin:     General: Skin is warm and dry.   Neurological:      Mental Status: Alert, oriented to person, place, and time and oriented to person, place and time.   Psychiatric:         Attention and Perception: Attention normal.         Mood and Affect: Mood normal.            Results Review:              I reviewed the patient's new clinical results.              Lab Results (last 72 hours)      Procedure Component Value Units Date/Time     Lipid Panel [[id number]] Collected: 11/02/22 0519     Specimen: Blood Updated: 11/02/22 0608       Total Cholesterol 148 mg/dL         Triglycerides 115 mg/dL         HDL Cholesterol 53 mg/dL         LDL Cholesterol  74 mg/dL         VLDL Cholesterol 21 mg/dL         LDL/HDL Ratio 1.36     Narrative:       Cholesterol Reference Ranges  (U.S. Department of Health and Human Services ATP III Classifications)     Desirable          <200 mg/dL  Borderline High    200-239 mg/dL  High Risk          >240 mg/dL        Triglyceride Reference Ranges  (U.S. Department of Health and Human Services ATP III Classifications)     Normal           <150 mg/dL  Borderline High  150-199 mg/dL  High             200-499 mg/dL  Very High        >500 mg/dL     HDL Reference Ranges  (U.S. Department of Health and Human Services ATP III Classifications)     Low     <40 mg/dl (major risk factor for CHD)  High    >60 mg/dl ('negative' risk factor for CHD)           LDL Reference Ranges  (U.S. Department of Health and Human Services ATP III Classifications)     Optimal          <100 mg/dL  Near Optimal     100-129 mg/dL  Borderline High  130-159 mg/dL  High             160-189 mg/dL  Very High        >189 mg/dL     Comprehensive Metabolic Panel [[id number]]   (Abnormal) Collected: 11/02/22 0519     Specimen: Blood Updated: 11/02/22 0608       Glucose 137 mg/dL         BUN 13 mg/dL         Creatinine 0.64 mg/dL         Sodium 139 mmol/L         Potassium 4.3 mmol/L         Chloride 105 mmol/L         CO2 23.0 mmol/L         Calcium 8.6 mg/dL         Total Protein 6.6 g/dL         Albumin 3.90 g/dL         ALT (SGPT) 50 U/L         AST (SGOT) 28 U/L         Alkaline Phosphatase 172 U/L         Total Bilirubin 0.4 mg/dL         Globulin 2.7 gm/dL         A/G Ratio 1.4 g/dL         BUN/Creatinine Ratio 20.3       Anion Gap 11.0 mmol/L         eGFR 106.5 mL/min/1.73         Comment: National Kidney Foundation and American Society of Nephrology (ASN) Task Force recommended calculation based on the Chronic Kidney Disease Epidemiology Collaboration (CKD-EPI) equation refit without adjustment for race.        Narrative:       GFR Normal >60  Chronic Kidney Disease <60  Kidney Failure <15        Hemoglobin A1c [[id number]]  (Abnormal) Collected: 11/02/22 0519     Specimen: Blood Updated: 11/02/22 0605       Hemoglobin A1C 5.70 %       Narrative:       Hemoglobin A1C Ranges:     Increased Risk for Diabetes  5.7% to 6.4%  Diabetes                     >= 6.5%  Diabetic Goal                < 7.0%     Troponin [[id number]]  (Abnormal) Collected: 11/02/22 0519     Specimen: Blood Updated: 11/02/22 0558       Troponin T 0.273 ng/mL       Narrative:       Troponin T Reference Range:  <= 0.03 ng/mL-   Negative for AMI  >0.03 ng/mL-     Abnormal for myocardial necrosis.  Clinicians would have to utilize clinical acumen, EKG, Troponin and serial changes to determine if it is an Acute Myocardial Infarction or myocardial injury due to an underlying chronic condition.         Results may be falsely decreased if patient taking Biotin.        CBC Auto Differential [[id number]]  (Abnormal) Collected: 11/02/22 0519     Specimen: Blood Updated: 11/02/22 0541       WBC 11.93 10*3/mm3         RBC 3.75  10*6/mm3         Hemoglobin 11.2 g/dL         Hematocrit 34.3 %         MCV 91.5 fL         MCH 29.9 pg         MCHC 32.7 g/dL         RDW 12.6 %         RDW-SD 41.9 fl         MPV 9.5 fL         Platelets 315 10*3/mm3         Neutrophil % 71.9 %         Lymphocyte % 14.9 %         Monocyte % 12.5 %         Eosinophil % 0.0 %         Basophil % 0.3 %         Immature Grans % 0.4 %         Neutrophils, Absolute 8.57 10*3/mm3         Lymphocytes, Absolute 1.78 10*3/mm3         Monocytes, Absolute 1.49 10*3/mm3         Eosinophils, Absolute 0.00 10*3/mm3         Basophils, Absolute 0.04 10*3/mm3         Immature Grans, Absolute 0.05 10*3/mm3         nRBC 0.0 /100 WBC       Magnesium [[id number]]  (Normal) Collected: 11/01/22 2238     Specimen: Blood Updated: 11/02/22 0122       Magnesium 1.7 mg/dL       [place] Draw [[id number]] Collected: 11/01/22 2040     Specimen: Blood Updated: 11/01/22 2346     Narrative:       The following orders were created for panel order [place] Draw.  Procedure                               Abnormality         Status                     ---------                               -----------         ------                     Green Top (Gel)[[id number]]                                  Final result               Lavender Top[[id number]]                                     Final result               Red Top[[id number]]                                          Final result               Light Blue Top[[id number]]                                   Final result                  Please view results for these tests on the individual orders.     Red Top [[id number]] Collected: 11/01/22 2238     Specimen: Blood Updated: 11/01/22 2346       Extra Tube Hold for add-ons.       Comment: Auto resulted.        Troponin [[id number]]  (Abnormal) Collected: 11/01/22 2238     Specimen: Blood Updated: 11/01/22 2309       Troponin T 0.393 ng/mL       Narrative:       Troponin T Reference Range:  <= 0.03 ng/mL-   Negative  for AMI  >0.03 ng/mL-     Abnormal for myocardial necrosis.  Clinicians would have to utilize clinical acumen, EKG, Troponin and serial changes to determine if it is an Acute Myocardial Infarction or myocardial injury due to an underlying chronic condition.         Results may be falsely decreased if patient taking Biotin.        Lipase [[id number]]  (Abnormal) Collected: 11/01/22 2040     Specimen: Blood Updated: 11/01/22 2201       Lipase 11 U/L       COVID PRE-OP / PRE-PROCEDURE SCREENING ORDER (NO ISOLATION) - Swab, Nasopharynx [[id number]]  (Normal) Collected: 11/01/22 2055     Specimen: Swab from Nasopharynx Updated: 11/01/22 2147     Narrative:       The following orders were created for panel order COVID PRE-OP / PRE-PROCEDURE SCREENING ORDER (NO ISOLATION) - Swab, Nasopharynx.  Procedure                               Abnormality         Status                     ---------                               -----------         ------                     COVID-19,CEPHEID/ALBINO,CO...[[id number]]  Normal              Final result                  Please view results for these tests on the individual orders.     COVID-19,CEPHEID/ALBINO,COR/CRESENCIO/PAD/KIRT/MAD IN-HOUSE(OR EMERGENT/ADD-ON),NP SWAB IN TRANSPORT MEDIA 3-4 HR TAT, RT-PCR - Swab, Nasopharynx [[id number]]  (Normal) Collected: 11/01/22 2055     Specimen: Swab from Nasopharynx Updated: 11/01/22 2147       COVID19 Not Detected     Narrative:       Fact sheet for providers: https://www.fda.gov/media/778674/download      Fact sheet for patients: https://www.fda.gov/media/855523/download  Fact sheet for providers: https://www.fda.gov/media/827270/download      Fact sheet for patients: https://www.fda.gov/media/196667/download     Protime-INR [[id number]]  (Abnormal) Collected: 11/01/22 2040     Specimen: Blood Updated: 11/01/22 2145       Protime 14.2 Seconds         INR 1.15     aPTT [[id number]]  (Abnormal) Collected: 11/01/22 2040     Specimen: Blood Updated: 11/01/22  2145       PTT 68.7 seconds       Green Top (Gel) [[id number]] Collected: 11/01/22 2040     Specimen: Blood Updated: 11/01/22 2145       Extra Tube Hold for add-ons.       Comment: Auto resulted.        Lavender Top [[id number]] Collected: 11/01/22 2040     Specimen: Blood Updated: 11/01/22 2145       Extra Tube hold for add-on       Comment: Auto resulted        Light Blue Top [[id number]] Collected: 11/01/22 2040     Specimen: Blood Updated: 11/01/22 2145       Extra Tube Hold for add-ons.       Comment: Auto resulted        Troponin [[id number]]  (Abnormal) Collected: 11/01/22 2040     Specimen: Blood Updated: 11/01/22 2140       Troponin T 0.445 ng/mL       Narrative:       Troponin T Reference Range:  <= 0.03 ng/mL-   Negative for AMI  >0.03 ng/mL-     Abnormal for myocardial necrosis.  Clinicians would have to utilize clinical acumen, EKG, Troponin and serial changes to determine if it is an Acute Myocardial Infarction or myocardial injury due to an underlying chronic condition.         Results may be falsely decreased if patient taking Biotin.        Comprehensive Metabolic Panel [[id number]]  (Abnormal) Collected: 11/01/22 2040     Specimen: Blood Updated: 11/01/22 2139       Glucose 140 mg/dL         BUN 13 mg/dL         Creatinine            Creatinine 0.53 mg/dL           Sodium 139 mmol/L         Potassium 3.1 mmol/L         Chloride 103 mmol/L         CO2 22.0 mmol/L         Calcium 8.9 mg/dL         Total Protein 6.9 g/dL         Albumin 4.10 g/dL         ALT (SGPT) 60 U/L         AST (SGOT) 35 U/L         Alkaline Phosphatase 199 U/L         Total Bilirubin 0.3 mg/dL         Globulin 2.8 gm/dL         A/G Ratio 1.5 g/dL         BUN/Creatinine Ratio 24.5       Anion Gap 14.0 mmol/L         eGFR 111.4 mL/min/1.73         Comment: National Kidney Foundation and American Society of Nephrology (ASN) Task Force recommended calculation based on the Chronic Kidney Disease Epidemiology Collaboration (CKD-EPI)  equation refit without adjustment for race.        Narrative:       GFR Normal >60  Chronic Kidney Disease <60  Kidney Failure <15        CBC & Differential [[id number]]  (Abnormal) Collected: 11/01/22 2040     Specimen: Blood Updated: 11/01/22 2114     Narrative:       The following orders were created for panel order CBC & Differential.  Procedure                               Abnormality         Status                     ---------                               -----------         ------                     CBC Auto Differential[[id number]]        Abnormal            Final result                  Please view results for these tests on the individual orders.     CBC Auto Differential [[id number]]  (Abnormal) Collected: 11/01/22 2040     Specimen: Blood Updated: 11/01/22 2114       WBC 18.47 10*3/mm3         RBC 3.95 10*6/mm3         Hemoglobin 11.9 g/dL         Hematocrit 35.9 %         MCV 90.9 fL         MCH 30.1 pg         MCHC 33.1 g/dL         RDW 12.6 %         RDW-SD 41.7 fl         MPV 9.7 fL         Platelets 377 10*3/mm3         Neutrophil % 85.5 %         Lymphocyte % 5.5 %         Monocyte % 8.1 %         Eosinophil % 0.0 %         Basophil % 0.2 %         Immature Grans % 0.7 %         Neutrophils, Absolute 15.78 10*3/mm3         Lymphocytes, Absolute 1.02 10*3/mm3         Monocytes, Absolute 1.50 10*3/mm3         Eosinophils, Absolute 0.00 10*3/mm3         Basophils, Absolute 0.04 10*3/mm3         Immature Grans, Absolute 0.13 10*3/mm3         nRBC 0.0 /100 WBC               No results found for: ECHOEFEST              Imaging Results (Last 72 Hours)      Procedure Component Value Units Date/Time     CT Abdomen Pelvis With Contrast [[id number]] Collected: 11/02/22 0442       Updated: 11/02/22 0450     Narrative:       EXAM/TECHNIQUE: CT abdomen pelvis with IV contrast     INDICATION: chest pain, epigastric pain, vomiting, diarrhea, elevated  troponin     COMPARISON: None available.     DLP: 272 mGy cm.  Automated exposure control was also utilized to  decrease patient radiation dose.     FINDINGS:     Lung bases are described in a separate same-day report.     No suspicious focal liver lesion. The gallbladder is surgically absent.  No biliary ductal dilatation. Pancreas appears normal. Spleen is  unremarkable. Adrenal glands appear normal.     No solid renal mass. No urolithiasis or hydronephrosis. No focal urinary  bladder wall thickening.      Sigmoid diverticulosis. There is a long segment of apparent sigmoid  colon wall thickening on axial image 75, although this may be secondary  to decompressed state. No definite pericolonic fat stranding. There is  also apparent wall thickening of the transverse colon which is also  decompressed. The appendix appears normal. No abnormal small bowel  distention or evidence of active small bowel inflammation.     No ascites or free pelvic fluid. Uterus and adnexa appear unremarkable.  No pelvic mass or pelvic collection.     Atherosclerotic nonaneurysmal abdominal aorta. No abdominal or pelvic  adenopathy.      No acute soft tissue finding. Multilevel lumbar spine degenerative  change, greatest at L5-S1. No acute osseous finding.        Impression:          Mild wall thickening of the transverse and sigmoid colon. This may be  related to decompressed state but also could represent an early mild  acute colitis. No other acute abdominopelvic findings.     These findings are in agreement with the critical and emergent findings  from the [facility] preliminary report.     This report was finalized on 11/02/2022 04:47 by Dr. Paul Stevens MD.     CT Angiogram Chest [[id number]] Collected: 11/02/22 0435       Updated: 11/02/22 0443     Narrative:       EXAM/TECHNIQUE: CT chest angiography with 3D MIP images with IV contrast     INDICATION: chest pain, epigastric pain, vomiting, diarrhea, elevated  troponin        COMPARISON: None available.     DLP: 218 mGy cm. Automated exposure  control was also utilized to  decrease patient radiation dose.     FINDINGS:     No evidence of pulmonary embolus. The main pulmonary artery is  nondilated. Thoracic aorta is normal in caliber and contains  atherosclerotic calcification. Trace pericardial fluid. Mild coronary  artery atherosclerotic calcification.      The central airways are clear. No pleural effusion. Atelectasis is  present in both posterior lower lungs. No definite consolidation.  Moderate centrilobular emphysema. No noncalcified pulmonary nodule or  mass identified. No enlarged lymph nodes in the chest.      No acute soft tissue finding. Upper abdomen is described in a separate  same-day report. No suspicious osseous finding.         Impression:          1.  No evidence of pulmonary embolus.      2.  Atelectasis in both posterior lower lungs.     3.  Moderate emphysema.     These findings are in agreement with the critical and emergent findings  from the [facility] preliminary report.  This report was finalized on 11/02/2022 04:40 by Dr. Paul Stevens MD.     XR Chest 1 View [[id number]] Collected: 11/01/22 2108       Updated: 11/01/22 2112     Narrative:       EXAMINATION:  XR CHEST 1 VW-  11/1/2022 9:03 PM CDT     HISTORY: Chest pain.     COMPARISON: 02/21/2006.     TECHNIQUE: Single view AP image.     FINDINGS:  There is hypoventilation with vascular crowding. There is  mild atelectasis in the lung bases. There is mild stable bronchial wall  thickening. The heart is normal in size. No acute bony abnormality is  seen.        Impression:       1. Hypoventilation with vascular crowding. Mild atelectasis in the lung  bases.  2. Stable bronchial wall thickening.        This report was finalized on 11/01/2022 21:09 by Dr. Dariel Jaime MD.                            Assessment & Plan          Elevated troponin    Chest pain, atypical, epigastric and with deep breathing    NSTEMI (non-ST elevated myocardial infarction) (HCC)    Hypokalemia     Transaminitis    Anxiety disorder    Leukocytosis  Anxiety disorder    Leukocytosis    Long term current use of antithrombotics/antiplatelets     Plan:  NSTEMI - troponin trending down- which is suspected given severe symptoms Monday night. Strong family history of CAD with several siblings and father. Quit smoking 3 years ago. Diabetic and Hyperlipidemia. Pain is improved with nitro drip but still present. Will plan for heart cath today. NPO      Hyperlipidemia - Recommend high intensity statin. Increase Lipitor.      Hypertension - blood pressure stable.      Type II DM - management per primary team      Peripheral Vascular Disease - notes left upper arm arthrosclerosis. Treated with Plavix and Statin. Has not followed with vascular in some time.      Further orders per Dr. Yan      Thank you for asking us to follow this patient with you.         Electronically signed by KOBI Sepulveda, 11/02/22, 9:34 AM CDT.    Author: -- Service: -- Author Type: --   Filed:  Date of Service:  Creation Time:    Status: (Other)      Test Reason : Chest Pain  Blood Pressure :   */*   mmHG  Vent. Rate : 102 BPM     Atrial Rate : 102 BPM     P-R Int : 142 ms          QRS Dur : 114 ms      QT Int : 382 ms       P-R-T Axes :   * -27 153 degrees     QTc Int : 497 ms     Sinus tachycardia  Incomplete right bundle branch block  Left ventricular hypertrophy with repolarization abnormality  Lateral infarct , age undetermined  Abnormal ECG  When compared with ECG of 01-NOV-2022 20:34,  Lateral infarct is now Present           Contains critical data Troponin  Order: [id number]   Status: Final result      Visible to patient: No (scheduled for 11/2/2022  7:58 PM)      Next appt: None     Specimen Information: Blood    0 Result Notes  Component Ref Range & Units 05:19 1 d ago 1 d ago   Troponin T 0.000 - 0.030 ng/mL 0.273 High Critical   0.393 High Critical   0.445 High Critical                     Current Facility-Administered  Medications   Medication Dose Route Frequency Provider Last Rate Last Admin   • acetaminophen (TYLENOL) tablet 650 mg  650 mg Oral Q4H PRN Angela Huynh APRN   650 mg at 11/02/22 0240    Or   • acetaminophen (TYLENOL) 160 MG/5ML solution 650 mg  650 mg Oral Q4H PRN Angela Huynh, APRN        Or   • acetaminophen (TYLENOL) suppository 650 mg  650 mg Rectal Q4H PRN Angela Huynh, APRN       • amLODIPine (NORVASC) tablet 10 mg  10 mg Oral Daily Angela Huynh APRJEAN       • atorvastatin (LIPITOR) tablet 40 mg  40 mg Oral Daily Tony Hauser, APRJEAN       • busPIRone (BUSPAR) tablet 15 mg  15 mg Oral BID Angela Huynh APRN   15 mg at 11/02/22 0954   • clopidogrel (PLAVIX) tablet 75 mg  75 mg Oral Daily Angela Huynh APRN   75 mg at 11/02/22 0958   • Enoxaparin Sodium (LOVENOX) syringe 80 mg  1 mg/kg Subcutaneous Q12H Angela Huynh APRJEAN       • famotidine (PEPCID) injection 20 mg  20 mg Intravenous Q12H Angela Huynh APRN   20 mg at 11/02/22 0841   • influenza vac split quad (FLUZONE,FLUARIX,AFLURIA,FLULAVAL) injection 0.5 mL  0.5 mL Intramuscular During Hospitalization Al Melchor DO       • nitroglycerin (TRIDIL) 200 mcg/ml infusion  5-200 mcg/min Intravenous Titrated Amaury Hernandez MD 4.5 mL/hr at 11/02/22 0135 15 mcg/min at 11/02/22 0135   • ondansetron (ZOFRAN) tablet 4 mg  4 mg Oral Q6H PRN Angela Huynh APRN        Or   • ondansetron (ZOFRAN) injection 4 mg  4 mg Intravenous Q6H PRN Angela Huynh, APRN       • sodium chloride 0.9 % flush 10 mL  10 mL Intravenous Q12H Angela Huynh, APRN   10 mL at 11/02/22 0958   • sodium chloride 0.9 % flush 10 mL  10 mL Intravenous PRN Angela Huynh APRJEAN       • traZODone (DESYREL) tablet 50 mg  50 mg Oral Nightly Angela Huynh APRN   50 mg at 11/02/22 0308

## 2022-11-02 NOTE — OP NOTE
[facility]  Date of procedure: 11/2/2022     Procedures performed:     1.  Access to the right femoral artery via modified Seldinger technique  2.  Left heart catheterization with retrograde crossing the aortic valve to the left ventricle  3.  Selective bilateral coronary angiography  4.  LV ventriculography  5.  Conscious sedation with continuous hemodynamic monitoring for 20 minutes  6.  Selective right iliofemoral angiography  7.  Patent hemostasis achieved in the right femoral artery access site using a 6 Cuban Angio-Seal closure device    Risk, Benefits, and Alternatives discussed with the patient and/or family.  Plan is for moderate sedation and the patient agrees to proceed with the procedure.  An immediate assessment was done prior to the administration of moderate sedation     Indication: NSTEMI  Premedication: Versed, fentanyl  Contrast: Isovue 112 cc  Radiation: Flouro time= 1.1 minutes. Air Kerma= 203 mGy  Catheters: 6Fr JL4, 6Fr JR4, 6Fr angled pigtail      Procedural details:    The patient was brought to the cath lab and prepped and draped in the usual fashion.  Access was obtained in the right femoral artery via modified Seldinger technique.  A 6 Cuban sheath was placed into the artery and flushed.  Next, a JL 4 catheter was inserted and used to engage the left main and selective left coronary angiography was performed in multiple views.  Next, a JR4 catheter was inserted and used to engage the right and selective right coronary angiography was performed in multiple views.  Next, pigtail catheter was inserted and used to cross the aortic valve into the left ventricle where pressures were recorded.  LV ventriculography was then performed.  This catheter was then withdrawn back cross the aortic valve and again pressures were recorded.  Everything was then withdrawn through the sheath and the sheath was flushed.  Patent hemostasis was achieved in the right femoral artery  access site using a 6 Polish Angio-Seal closure device.  Patient tolerated the procedure well without any complications.  She left the Cath Lab in stable condition.      I supervised the administration of conscious sedation by nursing staff throughout the case.  First dose was given at 1536 and the end of my face-to-face encounter was at 1552, accounting for a total of 20 minutes of supervision.  During the case, continuous pulse oximetry, heart rate, blood pressure, and patient status were monitored.     Findings:    Hemodynamics:    Aortic: 93/61 mmHg  LV: 125/1 mmHg  LVEDP: 21 mmHg    Left ventriculography:  1. The contractility of the left ventricle is mildly reduced with inferior basal hypokinesis noted.  Estimated ejection fraction 46-50%.  2. The left ventricle is normal in wall thickness and chamber size.  3. There is no significant mitral regurgitation or aortic insufficiency.    Selective coronary angiography:     Left main: Left main is a large-caliber vessel that bifurcates into the LAD and left circumflex coronary arteries, no angiographic evidence of stenosis, JASMEET-3 flow    LAD: The LAD is a large-caliber vessel that wraps around to the apex, there is minor disease noted in the midsegment, JASMEET-3 flow    Diagonals: First diagonal is a moderate caliber vessel with 40 to 50% stenosis at the ostium, the remaining diagonals are small caliber    Left circumflex: Left circumflex is a very large, dominant vessel with no angiographic evidence of stenosis, JASMEET-3 flow    Obtuse marginals: The first OM is large caliber with no significant disease, the second OM is moderate caliber with no significant disease    RCA: Right coronary artery is a large-caliber vessel with no angiographic evidence of stenosis, JASMEET-3 flow    PDA/KAITLYNN: PDA is a moderate caliber vessel with no significant disease.  The KAITLYNN is moderate caliber with no significant disease      Estimated Blood Loss: Minimal    Specimens:  None    Complications: None       Impression:  1.  Mild, nonobstructive coronary artery disease as described above  2.  Mildly reduced systolic function  3.  Hyperlipidemia  4.  Hypertension  5.  Type 2 diabetes  6.  Peripheral vascular disease       Plan:   1.  Return to the floor and monitor post procedure  2.  Wean nitro drip as tolerated  3.  Continue work-up for noncoronary causes for elevated troponin levels  4.  Continue to maximize medical regimen and modify cardiovascular risk factors        Raymundo Yan, DO  Interventional cardiology  [facility]  November 2, 2022

## 2022-11-02 NOTE — PLAN OF CARE
Problem: Adult Inpatient Plan of Care  Goal: Plan of Care Review  11/2/2022 0657 by Maya Sy, RN  Outcome: Ongoing, Progressing  Flowsheets (Taken 11/2/2022 0655)  Outcome Evaluation: VSS, c/o headache and epigastric pain, Nitro gtt infusing, NPO with ice chips, S-ST   11/2/2022 0655 by Maya Sy, RN  Outcome: Ongoing, Progressing  Flowsheets (Taken 11/2/2022 0655)  Plan of Care Reviewed With: patient  Outcome Evaluation: VSS, c/o headache and epigastric pain, Nitro gtt infusing, NPO with ice chips, S-ST    Goal Outcome Evaluation:

## 2022-11-02 NOTE — ED PROVIDER NOTES
Subjective   History of Present Illness  Patient states that she has been having some epigastric and substernal chest pain with some nausea vomiting.  States that she went to an outside hospital where she was transferred for further evaluation after elevated cardiac enzymes.  States that the pain radiates through to her back.  She states that she has not had any blood in her stool or blood in her urine.  Denies any fevers or chills.        Review of Systems   All other systems reviewed and are negative.      Past Medical History:   Diagnosis Date   • Anxiety and depression    • Hypertension    • Peripheral vascular disease (HCC)        Allergies   Allergen Reactions   • Penicillins Unknown - High Severity     As child   • Betadine [Povidone Iodine] Rash       Past Surgical History:   Procedure Laterality Date   • APPENDECTOMY     • BACK SURGERY     •  SECTION     • CYST REMOVAL         Family History   Problem Relation Age of Onset   • Diabetes Mother    • Hypertension Mother    • Kidney disease Mother    • Heart disease Mother    • Heart disease Sister    • Diabetes Sister    • Diabetes Brother    • Hypertension Brother    • Heart disease Brother        Social History     Socioeconomic History   • Marital status:    Tobacco Use   • Smoking status: Former     Packs/day: 1.50     Years: 25.00     Pack years: 37.50     Types: Cigarettes   Substance and Sexual Activity   • Alcohol use: Never   • Drug use: Never           Objective   Physical Exam  Vitals and nursing note reviewed.   Constitutional:       General: She is not in acute distress.     Appearance: Normal appearance. She is not toxic-appearing or diaphoretic.   HENT:      Head: Normocephalic and atraumatic.      Nose: Nose normal.   Eyes:      General:         Right eye: No discharge.         Left eye: No discharge.      Extraocular Movements: Extraocular movements intact.      Conjunctiva/sclera: Conjunctivae normal.   Cardiovascular:       Rate and Rhythm: Normal rate.      Pulses: Normal pulses.   Pulmonary:      Effort: Pulmonary effort is normal.   Abdominal:      General: Abdomen is flat.      Tenderness: There is abdominal tenderness (epigastric). There is no guarding or rebound.   Musculoskeletal:         General: Normal range of motion.      Cervical back: Normal range of motion.   Skin:     General: Skin is warm.   Neurological:      General: No focal deficit present.      Mental Status: She is alert and oriented to person, place, and time. Mental status is at baseline.   Psychiatric:         Mood and Affect: Mood normal.         Behavior: Behavior normal.         Thought Content: Thought content normal.         Judgment: Judgment normal.         Procedures           ED Course  ED Course as of 11/03/22 2034 Wed Nov 02, 2022   0128 Discussed with the cardiology provider on-call, Dr. Carranza, who recommended no acute cardiac intervention at this time as long as the patient is chest pain-free as her EKGs look largely unchanged although do show signs of recent ischemia. [NP]      ED Course User Index  [NP] Amaury Hernandez MD                                           MDM  Number of Diagnoses or Management Options  Elevated troponin  Diagnosis management comments: This is a 52yoF presenting with chest pain. Patient arrived hemodynamically stable and was afebrile. Patient was placed on the monitor and IV access was established.     Patient has received a total 324mg of aspirin since the onset of chest pain.     Differentials include, but are not limited to ACS, PE, musculoskeletal pain, infection. HEART score is 6. Plan to obtain cbc, cmp, ekg, troponin x 2, lipase, CTA chest, CT abdomen/pelvis, control pain, and reassess. EKG was obtained and did not reveal any malignant/unstable dysrhythmia or any acute ST elevations.     Presentation not consistent with other acute, emergent causes of chest pain at this time. Low suspicion for pneumothorax as the  patient is saturating well and has no radiographic evidence of a pneumothorax. Low suspicion for dissection there is no widened mediastinum, hypotension, pulse deficits, and no tearing back/abdominal pain. Low suspicion for tamponade as there is no JVD, muffled heart sounds, electrical alternans on EKG, and no hypotension. Low suspicion for pericarditis as there is no diffuse ST elevation or HI depression and the patient is afebrile. Low suspicion for myocarditis as there is no persistent tachycardia, recent viral illness, hypotension, or evidence of LVH.     The workup was reviewed and found to have evidence of an elevated troponin and leukocytosis. CT scans per [facility] do not show any acute infectious etiology. Given lovenox. Will admit for further evaluation and treatment.         Amount and/or Complexity of Data Reviewed  Clinical lab tests: reviewed and ordered  Tests in the radiology section of CPT®: reviewed and ordered        Final diagnoses:   Elevated troponin       ED Disposition  ED Disposition     ED Disposition   Decision to Admit    Condition   --    Comment   Level of Care: Telemetry [5]   Diagnosis: Elevated troponin [182117]   Admitting Physician: JUSTINE NGUYEN [[id number]]   Attending Physician: JUSTINE NGUYEN [[id number]]   Certification: I Certify That Inpatient Hospital Services Are Medically Necessary For Greater Than 2 Midnights               Carmencita Quintanilla, APRN  [address] [facility] [place] [address] [phone]    Follow up on 11/11/2022  @ 9:45 with Orion Temple at the [facility].  Consider referral to gastroenterology    Raymundo Yan,   [address] [facility] [address]  [facility] [id number] [phone]    Follow up on 12/1/2022  @ 8:45         Medication List      New Prescriptions    ciprofloxacin 500 MG tablet  Commonly known as: Cipro  Take 1 tablet by mouth 2 (Two) Times a Day for 5 days. Begin 11/4/2022  Start taking on: November 4, 2022     influenza vac split quad 0.5 ML  suspension prefilled syringe injection  Commonly known as: FLUZONE,FLUARIX,AFLURIA,FLULAVAL  Inject 0.5 mL into the appropriate muscle as directed by prescriber 1 (One) Time for 1 dose.     lisinopril 10 MG tablet  Commonly known as: PRINIVIL,ZESTRIL  Take 1 tablet by mouth Daily.  Start taking on: November 4, 2022     metoprolol succinate XL 25 MG 24 hr tablet  Commonly known as: TOPROL-XL  Take 1 tablet by mouth Daily.  Start taking on: November 4, 2022     metroNIDAZOLE 500 MG tablet  Commonly known as: Flagyl  Take 1 tablet by mouth 3 (Three) Times a Day.        Changed    atorvastatin 40 MG tablet  Commonly known as: LIPITOR  Take 1 tablet by mouth Daily.  Start taking on: November 4, 2022  What changed:   · medication strength  · how much to take        Stop    amLODIPine 10 MG tablet  Commonly known as: NORVASC     diclofenac 75 MG EC tablet  Commonly known as: VOLTAREN           Where to Get Your Medications      These medications were sent to [facility]/pharmacy #[id number] - [place] - [address] [facility] [phone] [facility] [phone] [address] [facility] [place] [address]    Phone: [phone]   · atorvastatin 40 MG tablet  · ciprofloxacin 500 MG tablet  · lisinopril 10 MG tablet  · metoprolol succinate XL 25 MG 24 hr tablet  · metroNIDAZOLE 500 MG tablet     Information about where to get these medications is not yet available    Ask your nurse or doctor about these medications  · influenza vac split quad 0.5 ML suspension prefilled syringe injection          Amaury Hernandez MD  11/03/22 2034

## 2022-11-02 NOTE — H&P
[facility] Medicine Services  HISTORY AND PHYSICAL    Date of Admission: 2022  Primary Care Physician: Carmencita Quintanilla APRN    Subjective     Chief Complaint: Transfer for higher level of care    History of Present Illness  Dina Gonzalez is a 52-year-old female with a past medical history of anxiety/depression, peripheral vascular disease, hypertension.  Patient presented to [facility] emergency department, with complaints of nausea and vomiting that started approximately midnight.  She states she developed mid epigastric pain today about 5 AM.  She denies having chest pain.  Work-up at [facility] did reveal elevated troponins and EKG that they felt was abnormal therefore she was transferred to [facility] emergency department.  Initial troponin 0.445, repeat 0.393.  Other findings potassium 3.1, transaminitis and white count 18.47.  [facility].  Patient did receive heparin drip, Pepcid IV and aspirin 324 mg at 1902.  Currently nitroglycerin drip is in use and she continues to deny chest pain.  She states epigastric pain has resolved.  She does report severe pain with deep breath.  She states she has not had a recent illness however she did have diaphoresis and chills last evening.  She reports feeling to be able to check her temperature.  She has no other complaints.  Saturation is 97% on 2 L.  She is admitted for further evaluation treatment    Review of Systems   A 10 point review of system was completed, all negative except for those discussed in HPI    Past Medical History:   Past Medical History:   Diagnosis Date   • Anxiety and depression    • Hypertension    • Peripheral vascular disease (HCC)        Past Surgical History:   Past Surgical History:   Procedure Laterality Date   • APPENDECTOMY     • BACK SURGERY     •  SECTION     • CYST REMOVAL         Family History:  "family history includes Diabetes in her brother, mother, and sister; Heart disease in her brother, mother, and sister; Hypertension in her brother and mother; Kidney disease in her mother.    Social History:  reports that she has quit smoking. Her smoking use included cigarettes. She has a 37.50 pack-year smoking history. She does not have any smokeless tobacco history on file. She reports that she does not drink alcohol and does not use drugs.    Code Status: Full, if unable speak for self her  Jah will speak for her      Allergies:  Allergies   Allergen Reactions   • Penicillins Unknown - High Severity     As child   • Betadine [Povidone Iodine] Rash       Medications:  No current facility-administered medications on file prior to encounter.     Current Outpatient Medications on File Prior to Encounter   Medication Sig Dispense Refill   • amLODIPine (NORVASC) 10 MG tablet Take 1 tablet by mouth Daily.     • atorvastatin (LIPITOR) 20 MG tablet Take 1 tablet by mouth Daily.     • busPIRone (BUSPAR) 15 MG tablet Take 1 tablet by mouth 2 (Two) Times a Day.     • clopidogrel (PLAVIX) 75 MG tablet Take 1 tablet by mouth Daily.     • cyclobenzaprine (FLEXERIL) 5 MG tablet Take 1 tablet by mouth Every Night.     • famotidine (PEPCID) 20 MG tablet Take 1 tablet by mouth 2 (Two) Times a Day.     • hydrOXYzine (ATARAX) 50 MG tablet Take 1 tablet by mouth Daily.     • traZODone (DESYREL) 50 MG tablet Take 1 tablet by mouth Every Night.     • vitamin D3 125 MCG (5000 UT) capsule capsule Take 1 capsule by mouth Daily.       I have utilized all available immediate resources to obtain, update, and review the patient's current medications.    Objective     /72   Pulse 100   Temp 99 °F (37.2 °C)   Resp 18   Ht 160 cm (63\")   Wt 75.9 kg (167 lb 4.8 oz)   SpO2 95%   BMI 29.64 kg/m²   Physical Exam  Vitals reviewed.   Constitutional:       Appearance: She is ill-appearing.   HENT:      Head: Normocephalic and " atraumatic.      Mouth/Throat:      Mouth: Mucous membranes are dry.      Pharynx: Oropharynx is clear.   Eyes:      Extraocular Movements: Extraocular movements intact.      Conjunctiva/sclera: Conjunctivae normal.   Cardiovascular:      Rate and Rhythm: Regular rhythm. Tachycardia present.   Pulmonary:      Breath sounds: Normal breath sounds.      Comments: Shallow respirations due to pain with deep breathing  Abdominal:      General: There is no distension.      Palpations: Abdomen is soft.      Tenderness: There is no abdominal tenderness ( No epigastric tenderness on palpation).   Musculoskeletal:         General: Normal range of motion.      Cervical back: Normal range of motion and neck supple.      Right lower leg: No edema.      Left lower leg: No edema.   Skin:     General: Skin is warm and dry.   Neurological:      General: No focal deficit present.      Mental Status: She is alert and oriented to person, place, and time.   Psychiatric:         Behavior: Behavior normal.      Comments: Situational anxiety and with a history of anxiety disorder       Pertinent Data:   Lab Results (last 72 hours)       Procedure Component Value Units Date/Time    Troponin [[id number]]  (Abnormal) Collected: 11/01/22 2238    Specimen: Blood Updated: 11/01/22 2309     Troponin T 0.393 ng/mL     Lipase [[id number]]  (Abnormal) Collected: 11/01/22 2040    Specimen: Blood Updated: 11/01/22 2201     Lipase 11 U/L     COVID-19,CEPHEID/ALBINO,COR/CRESENCIO/PAD/KIRT/MAD IN-HOUSE(OR EMERGENT/ADD-ON),NP SWAB IN TRANSPORT MEDIA 3-4 HR TAT, RT-PCR - Swab, Nasopharynx [[id number]]  (Normal) Collected: 11/01/22 2055    Specimen: Swab from Nasopharynx Updated: 11/01/22 2147     COVID19 Not Detected    Protime-INR [[id number]]  (Abnormal) Collected: 11/01/22 2040    Specimen: Blood Updated: 11/01/22 2145     Protime 14.2 Seconds      INR 1.15    aPTT [[id number]]  (Abnormal) Collected: 11/01/22 2040    Specimen: Blood Updated: 11/01/22 2145     PTT 68.7  seconds     Troponin [[id number]]  (Abnormal) Collected: 11/01/22 2040    Specimen: Blood Updated: 11/01/22 2140     Troponin T 0.445 ng/mL     Comprehensive Metabolic Panel [[id number]]  (Abnormal) Collected: 11/01/22 2040    Specimen: Blood Updated: 11/01/22 2139     Glucose 140 mg/dL      BUN 13 mg/dL      Creatinine 0.53 mg/dL      Sodium 139 mmol/L      Potassium 3.1 mmol/L      Chloride 103 mmol/L      CO2 22.0 mmol/L      Calcium 8.9 mg/dL      Total Protein 6.9 g/dL      Albumin 4.10 g/dL      ALT (SGPT) 60 U/L      AST (SGOT) 35 U/L      Alkaline Phosphatase 199 U/L      Total Bilirubin 0.3 mg/dL      Globulin 2.8 gm/dL      A/G Ratio 1.5 g/dL      BUN/Creatinine Ratio 24.5     Anion Gap 14.0 mmol/L      eGFR 111.4 mL/min/1.73     CBC Auto Differential [[id number]]  (Abnormal) Collected: 11/01/22 2040    Specimen: Blood Updated: 11/01/22 2114     WBC 18.47 10*3/mm3      RBC 3.95 10*6/mm3      Hemoglobin 11.9 g/dL      Hematocrit 35.9 %      MCV 90.9 fL      MCH 30.1 pg      MCHC 33.1 g/dL      RDW 12.6 %      RDW-SD 41.7 fl      MPV 9.7 fL      Platelets 377 10*3/mm3      Neutrophil % 85.5 %      Lymphocyte % 5.5 %      Monocyte % 8.1 %      Eosinophil % 0.0 %      Basophil % 0.2 %      Immature Grans % 0.7 %      Neutrophils, Absolute 15.78 10*3/mm3      Lymphocytes, Absolute 1.02 10*3/mm3      Monocytes, Absolute 1.50 10*3/mm3      Eosinophils, Absolute 0.00 10*3/mm3      Basophils, Absolute 0.04 10*3/mm3      Immature Grans, Absolute 0.13 10*3/mm3      nRBC 0.0 /100 WBC           Imaging Results (Last 24 Hours)       Procedure Component Value Units Date/Time    CT Angiogram Chest [[id number]] Resulted: 11/01/22 2207     Updated: 11/01/22 2220    CT Abdomen Pelvis With Contrast [[id number]] Resulted: 11/01/22 2207     Updated: 11/01/22 2220    XR Chest 1 View [[id number]] Collected: 11/01/22 2108     Updated: 11/01/22 2112    Narrative:      EXAMINATION:  XR CHEST 1 VW-  11/1/2022 9:03 PM CDT     HISTORY: Chest  pain.     COMPARISON: 02/21/2006.     TECHNIQUE: Single view AP image.     FINDINGS:  There is hypoventilation with vascular crowding. There is  mild atelectasis in the lung bases. There is mild stable bronchial wall  thickening. The heart is normal in size. No acute bony abnormality is  seen.       Impression:      1. Hypoventilation with vascular crowding. Mild atelectasis in the lung  bases.  2. Stable bronchial wall thickening.        This report was finalized on 11/01/2022 21:09 by Dr. Dariel Jaime MD.          Obtained at [facility] facility  WBC 21.9, potassium 3.2, elevated liver function studies, lipase 44, CK2 74, CK-MB 16.9, troponin three 533.2  Drug screen positive for oxycodone, TCA and THC  Urinalysis, flu A/B and COVID-negative    Assessment / Plan     Assessment:   Active Hospital Problems    Diagnosis    • **Elevated troponin    • Chest pain, atypical, epigastric and with deep breathing    • NSTEMI (non-ST elevated myocardial infarction) (HCC)    • Hypokalemia    • Transaminitis    • Anxiety disorder    • Leukocytosis    • Long term current use of antithrombotics/antiplatelets        Plan:   1.  Admit as inpatient  2.  Consult cardiology in a.m.  3.  N.p.o. for possible intervention in a.m.  4.  Serial troponins and EKGs  5.  Replace potassium  6.  GI cocktail  7.  Home medications reviewed and restarted as appropriate  8.  Continue full dose Lovenox for now  9.  Continue Tridil drip  10.  Supplemental oxygen as needed, incentive spirometry, continuous pulse oximetry, cardiac monitoring  11.  Labs in a.m., stat magnesium    I discussed the patient's findings and my recommendations with: Al Melchor,     Case and plan discussed with the NP. Tachycardia. Cardiology consult in the am    Time spent: 40 minutes    Patient seen and examined by me on 11/2/2022 at 12:09 AM.    Electronically signed by KOBI Gutiérrez, 11/02/22, 00:56 CDT.

## 2022-11-03 ENCOUNTER — APPOINTMENT (OUTPATIENT)
Dept: CARDIOLOGY | Facility: HOSPITAL | Age: 52
End: 2022-11-03

## 2022-11-03 ENCOUNTER — READMISSION MANAGEMENT (OUTPATIENT)
Dept: CALL CENTER | Facility: HOSPITAL | Age: 52
End: 2022-11-03

## 2022-11-03 VITALS
OXYGEN SATURATION: 93 % | SYSTOLIC BLOOD PRESSURE: 128 MMHG | HEART RATE: 107 BPM | TEMPERATURE: 96 F | WEIGHT: 152 LBS | DIASTOLIC BLOOD PRESSURE: 70 MMHG | BODY MASS INDEX: 26.93 KG/M2 | RESPIRATION RATE: 18 BRPM | HEIGHT: 63 IN

## 2022-11-03 DIAGNOSIS — I21.4 NSTEMI (NON-ST ELEVATED MYOCARDIAL INFARCTION): Primary | ICD-10-CM

## 2022-11-03 PROBLEM — A04.9 BACTERIAL COLITIS: Status: ACTIVE | Noted: 2022-11-03

## 2022-11-03 PROBLEM — I51.9 LV DYSFUNCTION: Status: ACTIVE | Noted: 2022-11-03

## 2022-11-03 LAB
ANION GAP SERPL CALCULATED.3IONS-SCNC: 12 MMOL/L (ref 5–15)
BH CV ECHO MEAS - AO MAX PG: 11.6 MMHG
BH CV ECHO MEAS - AO MEAN PG: 7 MMHG
BH CV ECHO MEAS - AO ROOT DIAM: 3 CM
BH CV ECHO MEAS - AO V2 MAX: 170 CM/SEC
BH CV ECHO MEAS - AO V2 VTI: 29.1 CM
BH CV ECHO MEAS - AVA(I,D): 3.1 CM2
BH CV ECHO MEAS - EDV(CUBED): 125 ML
BH CV ECHO MEAS - EDV(MOD-SP2): 66 ML
BH CV ECHO MEAS - EDV(MOD-SP4): 111 ML
BH CV ECHO MEAS - EF(MOD-BP): 58 %
BH CV ECHO MEAS - EF(MOD-SP2): 47 %
BH CV ECHO MEAS - EF(MOD-SP4): 64.9 %
BH CV ECHO MEAS - ESV(CUBED): 54.9 ML
BH CV ECHO MEAS - ESV(MOD-SP2): 35 ML
BH CV ECHO MEAS - ESV(MOD-SP4): 39 ML
BH CV ECHO MEAS - FS: 24 %
BH CV ECHO MEAS - IVS/LVPW: 0.91 CM
BH CV ECHO MEAS - IVSD: 1 CM
BH CV ECHO MEAS - LA DIMENSION: 3.7 CM
BH CV ECHO MEAS - LV DIASTOLIC VOL/BSA (35-75): 64.5 CM2
BH CV ECHO MEAS - LV MASS(C)D: 194.4 GRAMS
BH CV ECHO MEAS - LV MAX PG: 6.1 MMHG
BH CV ECHO MEAS - LV MEAN PG: 3 MMHG
BH CV ECHO MEAS - LV SYSTOLIC VOL/BSA (12-30): 22.7 CM2
BH CV ECHO MEAS - LV V1 MAX: 123 CM/SEC
BH CV ECHO MEAS - LV V1 VTI: 23.8 CM
BH CV ECHO MEAS - LVIDD: 5 CM
BH CV ECHO MEAS - LVIDS: 3.8 CM
BH CV ECHO MEAS - LVOT AREA: 3.8 CM2
BH CV ECHO MEAS - LVOT DIAM: 2.2 CM
BH CV ECHO MEAS - LVPWD: 1.1 CM
BH CV ECHO MEAS - MR MAX PG: 65.3 MMHG
BH CV ECHO MEAS - MR MAX VEL: 404 CM/SEC
BH CV ECHO MEAS - MV A MAX VEL: 74 CM/SEC
BH CV ECHO MEAS - MV DEC SLOPE: 467 CM/SEC2
BH CV ECHO MEAS - MV E MAX VEL: 118 CM/SEC
BH CV ECHO MEAS - MV E/A: 1.59
BH CV ECHO MEAS - MV P1/2T: 90.3 MSEC
BH CV ECHO MEAS - MVA(P1/2T): 2.44 CM2
BH CV ECHO MEAS - PA V2 MAX: 91.2 CM/SEC
BH CV ECHO MEAS - RAP SYSTOLE: 10 MMHG
BH CV ECHO MEAS - RV MAX PG: 2.4 MMHG
BH CV ECHO MEAS - RV V1 MAX: 77.5 CM/SEC
BH CV ECHO MEAS - RVDD: 3.1 CM
BH CV ECHO MEAS - RVSP: 38.3 MMHG
BH CV ECHO MEAS - SI(MOD-SP2): 18 ML/M2
BH CV ECHO MEAS - SI(MOD-SP4): 41.8 ML/M2
BH CV ECHO MEAS - SV(LVOT): 90.5 ML
BH CV ECHO MEAS - SV(MOD-SP2): 31 ML
BH CV ECHO MEAS - SV(MOD-SP4): 72 ML
BH CV ECHO MEAS - TR MAX PG: 28.3 MMHG
BH CV ECHO MEAS - TR MAX VEL: 266 CM/SEC
BUN SERPL-MCNC: 15 MG/DL (ref 6–20)
BUN/CREAT SERPL: 23.4 (ref 7–25)
CALCIUM SPEC-SCNC: 8.7 MG/DL (ref 8.6–10.5)
CHLORIDE SERPL-SCNC: 107 MMOL/L (ref 98–107)
CO2 SERPL-SCNC: 23 MMOL/L (ref 22–29)
CREAT SERPL-MCNC: 0.64 MG/DL (ref 0.57–1)
DEPRECATED RDW RBC AUTO: 43.2 FL (ref 37–54)
EGFRCR SERPLBLD CKD-EPI 2021: 106.5 ML/MIN/1.73
ERYTHROCYTE [DISTWIDTH] IN BLOOD BY AUTOMATED COUNT: 12.6 % (ref 12.3–15.4)
GLUCOSE BLDC GLUCOMTR-MCNC: 97 MG/DL (ref 70–130)
GLUCOSE SERPL-MCNC: 86 MG/DL (ref 65–99)
HCT VFR BLD AUTO: 35.8 % (ref 34–46.6)
HGB BLD-MCNC: 11.2 G/DL (ref 12–15.9)
LEFT ATRIUM VOLUME INDEX: 35.5 ML/M2
LEFT ATRIUM VOLUME: 55 ML
MAXIMAL PREDICTED HEART RATE: 168 BPM
MCH RBC QN AUTO: 29.6 PG (ref 26.6–33)
MCHC RBC AUTO-ENTMCNC: 31.3 G/DL (ref 31.5–35.7)
MCV RBC AUTO: 94.5 FL (ref 79–97)
PLATELET # BLD AUTO: 285 10*3/MM3 (ref 140–450)
PMV BLD AUTO: 9.4 FL (ref 6–12)
POTASSIUM SERPL-SCNC: 3.6 MMOL/L (ref 3.5–5.2)
RBC # BLD AUTO: 3.79 10*6/MM3 (ref 3.77–5.28)
SODIUM SERPL-SCNC: 142 MMOL/L (ref 136–145)
STRESS TARGET HR: 143 BPM
WBC NRBC COR # BLD: 8.16 10*3/MM3 (ref 3.4–10.8)

## 2022-11-03 PROCEDURE — 82962 GLUCOSE BLOOD TEST: CPT

## 2022-11-03 PROCEDURE — 85027 COMPLETE CBC AUTOMATED: CPT | Performed by: EMERGENCY MEDICINE

## 2022-11-03 PROCEDURE — 25010000002 ONDANSETRON PER 1 MG: Performed by: EMERGENCY MEDICINE

## 2022-11-03 PROCEDURE — 25010000002 ENOXAPARIN PER 10 MG: Performed by: EMERGENCY MEDICINE

## 2022-11-03 PROCEDURE — 80048 BASIC METABOLIC PNL TOTAL CA: CPT | Performed by: EMERGENCY MEDICINE

## 2022-11-03 PROCEDURE — 93306 TTE W/DOPPLER COMPLETE: CPT

## 2022-11-03 PROCEDURE — 93306 TTE W/DOPPLER COMPLETE: CPT | Performed by: EMERGENCY MEDICINE

## 2022-11-03 PROCEDURE — 99232 SBSQ HOSP IP/OBS MODERATE 35: CPT | Performed by: NURSE PRACTITIONER

## 2022-11-03 RX ORDER — LOPERAMIDE HYDROCHLORIDE 2 MG/1
2 CAPSULE ORAL 4 TIMES DAILY PRN
Status: DISCONTINUED | OUTPATIENT
Start: 2022-11-03 | End: 2022-11-03 | Stop reason: HOSPADM

## 2022-11-03 RX ORDER — CIPROFLOXACIN 500 MG/1
500 TABLET, FILM COATED ORAL 2 TIMES DAILY
Qty: 10 TABLET | Refills: 0 | Status: SHIPPED | OUTPATIENT
Start: 2022-11-04 | End: 2022-11-03 | Stop reason: SDUPTHER

## 2022-11-03 RX ORDER — POTASSIUM CHLORIDE 750 MG/1
20 CAPSULE, EXTENDED RELEASE ORAL ONCE
Status: COMPLETED | OUTPATIENT
Start: 2022-11-03 | End: 2022-11-03

## 2022-11-03 RX ORDER — METOPROLOL SUCCINATE 25 MG/1
25 TABLET, EXTENDED RELEASE ORAL
Qty: 30 TABLET | Refills: 1 | Status: SHIPPED | OUTPATIENT
Start: 2022-11-04 | End: 2022-11-03 | Stop reason: SDUPTHER

## 2022-11-03 RX ORDER — ATORVASTATIN CALCIUM 40 MG/1
40 TABLET, FILM COATED ORAL DAILY
Qty: 30 TABLET | Refills: 1 | Status: SHIPPED | OUTPATIENT
Start: 2022-11-04 | End: 2022-11-17 | Stop reason: SDUPTHER

## 2022-11-03 RX ORDER — METRONIDAZOLE 500 MG/1
500 TABLET ORAL 3 TIMES DAILY
Qty: 18 TABLET | Refills: 0 | Status: SHIPPED | OUTPATIENT
Start: 2022-11-03 | End: 2022-11-03 | Stop reason: SDUPTHER

## 2022-11-03 RX ORDER — METOPROLOL SUCCINATE 25 MG/1
25 TABLET, EXTENDED RELEASE ORAL
Status: DISCONTINUED | OUTPATIENT
Start: 2022-11-03 | End: 2022-11-03 | Stop reason: HOSPADM

## 2022-11-03 RX ORDER — METOPROLOL SUCCINATE 25 MG/1
25 TABLET, EXTENDED RELEASE ORAL
Qty: 30 TABLET | Refills: 1 | Status: SHIPPED | OUTPATIENT
Start: 2022-11-04 | End: 2022-11-17

## 2022-11-03 RX ORDER — CIPROFLOXACIN 500 MG/1
500 TABLET, FILM COATED ORAL 2 TIMES DAILY
Qty: 10 TABLET | Refills: 0 | Status: SHIPPED | OUTPATIENT
Start: 2022-11-04 | End: 2022-11-09

## 2022-11-03 RX ORDER — ATORVASTATIN CALCIUM 40 MG/1
40 TABLET, FILM COATED ORAL DAILY
Qty: 30 TABLET | Refills: 1 | Status: SHIPPED | OUTPATIENT
Start: 2022-11-04 | End: 2022-11-03 | Stop reason: SDUPTHER

## 2022-11-03 RX ORDER — LISINOPRIL 10 MG/1
10 TABLET ORAL
Qty: 30 TABLET | Refills: 1 | Status: SHIPPED | OUTPATIENT
Start: 2022-11-04 | End: 2022-11-03 | Stop reason: SDUPTHER

## 2022-11-03 RX ORDER — LISINOPRIL 10 MG/1
10 TABLET ORAL
Status: DISCONTINUED | OUTPATIENT
Start: 2022-11-03 | End: 2022-11-03 | Stop reason: HOSPADM

## 2022-11-03 RX ORDER — METRONIDAZOLE 500 MG/1
500 TABLET ORAL 3 TIMES DAILY
Qty: 18 TABLET | Refills: 0 | Status: SHIPPED | OUTPATIENT
Start: 2022-11-03 | End: 2022-11-17

## 2022-11-03 RX ORDER — LISINOPRIL 10 MG/1
10 TABLET ORAL
Qty: 30 TABLET | Refills: 1 | Status: SHIPPED | OUTPATIENT
Start: 2022-11-04 | End: 2022-11-17 | Stop reason: SDUPTHER

## 2022-11-03 RX ADMIN — Medication 5000 UNITS: at 09:33

## 2022-11-03 RX ADMIN — ATORVASTATIN CALCIUM 40 MG: 40 TABLET, FILM COATED ORAL at 09:32

## 2022-11-03 RX ADMIN — ENOXAPARIN SODIUM 80 MG: 100 INJECTION SUBCUTANEOUS at 05:08

## 2022-11-03 RX ADMIN — SERTRALINE HYDROCHLORIDE 100 MG: 100 TABLET, FILM COATED ORAL at 09:32

## 2022-11-03 RX ADMIN — BUSPIRONE HYDROCHLORIDE 15 MG: 5 TABLET ORAL at 09:31

## 2022-11-03 RX ADMIN — METOPROLOL SUCCINATE 25 MG: 25 TABLET, EXTENDED RELEASE ORAL at 09:35

## 2022-11-03 RX ADMIN — LOPERAMIDE HYDROCHLORIDE 2 MG: 2 CAPSULE ORAL at 15:34

## 2022-11-03 RX ADMIN — POTASSIUM CHLORIDE 20 MEQ: 10 CAPSULE, COATED, EXTENDED RELEASE ORAL at 09:29

## 2022-11-03 RX ADMIN — ONDANSETRON 4 MG: 2 INJECTION INTRAMUSCULAR; INTRAVENOUS at 08:56

## 2022-11-03 RX ADMIN — GLIPIZIDE 5 MG: 5 TABLET ORAL at 09:30

## 2022-11-03 RX ADMIN — LISINOPRIL 10 MG: 10 TABLET ORAL at 11:13

## 2022-11-03 RX ADMIN — TRAZODONE HYDROCHLORIDE 50 MG: 50 TABLET ORAL at 00:26

## 2022-11-03 RX ADMIN — METRONIDAZOLE 500 MG: 500 INJECTION, SOLUTION INTRAVENOUS at 02:30

## 2022-11-03 RX ADMIN — HYDROXYZINE HYDROCHLORIDE 50 MG: 25 TABLET ORAL at 09:28

## 2022-11-03 RX ADMIN — CLOPIDOGREL 75 MG: 75 TABLET, FILM COATED ORAL at 09:31

## 2022-11-03 RX ADMIN — METRONIDAZOLE 500 MG: 500 INJECTION, SOLUTION INTRAVENOUS at 12:26

## 2022-11-03 RX ADMIN — SODIUM CHLORIDE 100 ML/HR: 9 INJECTION, SOLUTION INTRAVENOUS at 06:11

## 2022-11-03 RX ADMIN — ONDANSETRON 4 MG: 2 INJECTION INTRAMUSCULAR; INTRAVENOUS at 15:38

## 2022-11-03 RX ADMIN — LOPERAMIDE HYDROCHLORIDE 2 MG: 2 CAPSULE ORAL at 05:08

## 2022-11-03 NOTE — DISCHARGE SUMMARY
[facility] Medicine Services  DISCHARGE SUMMARY     Date of Admission: 11/1/2022  Date of Discharge:  11/3/2022  Primary Care Physician: Carmencita Quintanilla APRN    Presenting Problem/Chief Complaint:  Chest pain.  Transferred for elevated troponin    Final Discharge Diagnoses:  Active Hospital Problems    Diagnosis    • **Elevated troponin secondary to suspected colitis    • Sigmoid colitis    • Chest pain, atypical, epigastric and with deep breathing    • NSTEMI type 2 secondary to suspectd colitis (non-ST elevated myocardial infarction) (HCC)    • Hypokalemia    • Transaminitis    • Anxiety disorder    • Leukocytosis    • Long term current use of antithrombotics/antiplatelets      Consults: Dr. Yan, cardiology    Procedures Performed:   Cardiac catheterization 11/2/2022  Impression:  1.  Mild, nonobstructive coronary artery disease as described above  2.  Mildly reduced systolic function  3.  Hyperlipidemia  4.  Hypertension  5.  Type 2 diabetes  6.  Peripheral vascular disease     Pertinent Test Results:   Results for orders placed during the hospital encounter of 11/01/22    Adult Transthoracic Echo Complete W/ Cont if Necessary Per Protocol    Interpretation Summary  •  Left ventricular diastolic function was not assessed.  •  Left ventricular systolic function is normal. Left ventricular ejection fraction appears to be 56 - 60%.  •  The left ventricular cavity is mildly dilated. Left ventricular wall thickness is consistent with concentric hypertrophy.  •  Mild pulmonary hypertension is present.  •  No hemodynamically significant valvular disease by Doppler exam.      Imaging Results (All)     Procedure Component Value Units Date/Time    CT Abdomen Pelvis With Contrast [[id number]] Collected: 11/02/22 0442     Updated: 11/02/22 0450    Narrative:      EXAM/TECHNIQUE: CT abdomen pelvis with IV contrast     INDICATION: chest pain, epigastric pain, vomiting, diarrhea,  elevated  troponin     COMPARISON: None available.     DLP: 272 mGy cm. Automated exposure control was also utilized to  decrease patient radiation dose.     FINDINGS:     Lung bases are described in a separate same-day report.     No suspicious focal liver lesion. The gallbladder is surgically absent.  No biliary ductal dilatation. Pancreas appears normal. Spleen is  unremarkable. Adrenal glands appear normal.     No solid renal mass. No urolithiasis or hydronephrosis. No focal urinary  bladder wall thickening.      Sigmoid diverticulosis. There is a long segment of apparent sigmoid  colon wall thickening on axial image 75, although this may be secondary  to decompressed state. No definite pericolonic fat stranding. There is  also apparent wall thickening of the transverse colon which is also  decompressed. The appendix appears normal. No abnormal small bowel  distention or evidence of active small bowel inflammation.     No ascites or free pelvic fluid. Uterus and adnexa appear unremarkable.  No pelvic mass or pelvic collection.     Atherosclerotic nonaneurysmal abdominal aorta. No abdominal or pelvic  adenopathy.      No acute soft tissue finding. Multilevel lumbar spine degenerative  change, greatest at L5-S1. No acute osseous finding.       Impression:         Mild wall thickening of the transverse and sigmoid colon. This may be  related to decompressed state but also could represent an early mild  acute colitis. No other acute abdominopelvic findings.     These findings are in agreement with the critical and emergent findings  from the [facility] preliminary report.     This report was finalized on 11/02/2022 04:47 by Dr. Paul Stevens MD.    CT Angiogram Chest [[id number]] Collected: 11/02/22 0435     Updated: 11/02/22 0443    Narrative:      EXAM/TECHNIQUE: CT chest angiography with 3D MIP images with IV contrast     INDICATION: chest pain, epigastric pain, vomiting, diarrhea, elevated  troponin     COMPARISON:  None available.     DLP: 218 mGy cm. Automated exposure control was also utilized to  decrease patient radiation dose.     FINDINGS:     No evidence of pulmonary embolus. The main pulmonary artery is  nondilated. Thoracic aorta is normal in caliber and contains  atherosclerotic calcification. Trace pericardial fluid. Mild coronary  artery atherosclerotic calcification.      The central airways are clear. No pleural effusion. Atelectasis is  present in both posterior lower lungs. No definite consolidation.  Moderate centrilobular emphysema. No noncalcified pulmonary nodule or  mass identified. No enlarged lymph nodes in the chest.      No acute soft tissue finding. Upper abdomen is described in a separate  same-day report. No suspicious osseous finding.       Impression:         1.  No evidence of pulmonary embolus.      2.  Atelectasis in both posterior lower lungs.     3.  Moderate emphysema.     These findings are in agreement with the critical and emergent findings  from the [facility] preliminary report.  This report was finalized on 11/02/2022 04:40 by Dr. Paul Stevens MD.    XR Chest 1 View [[id number]] Collected: 11/01/22 2108     Updated: 11/01/22 2112    Narrative:      EXAMINATION:  XR CHEST 1 VW-  11/1/2022 9:03 PM CDT     HISTORY: Chest pain.     COMPARISON: 02/21/2006.     TECHNIQUE: Single view AP image.     FINDINGS:  There is hypoventilation with vascular crowding. There is  mild atelectasis in the lung bases. There is mild stable bronchial wall  thickening. The heart is normal in size. No acute bony abnormality is  seen.       Impression:      1. Hypoventilation with vascular crowding. Mild atelectasis in the lung  bases.  2. Stable bronchial wall thickening.        This report was finalized on 11/01/2022 21:09 by Dr. Dariel Jaime MD.        LAB RESULTS:      Lab 11/03/22  0357 11/02/22  0519 11/01/22 2040   WBC 8.16 11.93* 18.47*   HEMOGLOBIN 11.2* 11.2* 11.9*   HEMATOCRIT 35.8 34.3 35.9    PLATELETS 285 315 377   NEUTROS ABS  --  8.57* 15.78*   IMMATURE GRANS (ABS)  --  0.05 0.13*   LYMPHS ABS  --  1.78 1.02   MONOS ABS  --  1.49* 1.50*   EOS ABS  --  0.00 0.00   MCV 94.5 91.5 90.9   PROTIME  --   --  14.2   APTT  --   --  68.7*         Lab 11/03/22  0357 11/02/22 0519 11/01/22 2238 11/01/22 2040   SODIUM 142 139  --  139   POTASSIUM 3.6 4.3  --  3.1*   CHLORIDE 107 105  --  103   CO2 23.0 23.0  --  22.0   ANION GAP 12.0 11.0  --  14.0   BUN 15 13  --  13   CREATININE 0.64 0.64  --  0.53*   EGFR 106.5 106.5  --  111.4   GLUCOSE 86 137*  --  140*   CALCIUM 8.7 8.6  --  8.9   MAGNESIUM  --   --  1.7  --    HEMOGLOBIN A1C  --  5.70*  --   --          Lab 11/02/22  0519 11/01/22 2040   TOTAL PROTEIN 6.6 6.9   ALBUMIN 3.90 4.10   GLOBULIN 2.7 2.8   ALT (SGPT) 50* 60*   AST (SGOT) 28 35*   BILIRUBIN 0.4 0.3   ALK PHOS 172* 199*   LIPASE  --  11*         Lab 11/02/22  0519 11/01/22 2238 11/01/22 2040   PROBNP 13,994.0*  --   --    TROPONIN T 0.273* 0.393* 0.445*   PROTIME  --   --  14.2   INR  --   --  1.15*         Lab 11/02/22 0519   CHOLESTEROL 148   LDL CHOL 74   HDL CHOL 53   TRIGLYCERIDES 115             Brief Urine Lab Results     None        Microbiology Results (last 10 days)     Procedure Component Value - Date/Time    COVID PRE-OP / PRE-PROCEDURE SCREENING ORDER (NO ISOLATION) - Swab, Nasopharynx [[id number]]  (Normal) Collected: 11/01/22 2055    Lab Status: Final result Specimen: Swab from Nasopharynx Updated: 11/01/22 2147    Narrative:      The following orders were created for panel order COVID PRE-OP / PRE-PROCEDURE SCREENING ORDER (NO ISOLATION) - Swab, Nasopharynx.  Procedure                               Abnormality         Status                     ---------                               -----------         ------                     COVID-19,CEPHEID/ALBINO,CO...[[id number]]  Normal              Final result                 Please view results for these tests on the individual  "orders.    COVID-19,CEPHEID/ALBINO,COR/CRESENCIO/PAD/KIRT/MAD IN-HOUSE(OR EMERGENT/ADD-ON),NP SWAB IN TRANSPORT MEDIA 3-4 HR TAT, RT-PCR - Swab, Nasopharynx [[id number]]  (Normal) Collected: 11/01/22 2055    Lab Status: Final result Specimen: Swab from Nasopharynx Updated: 11/01/22 2147     COVID19 Not Detected    Narrative:      Fact sheet for providers: https://www.fda.gov/media/044945/download     Fact sheet for patients: https://www.fda.gov/media/301109/download  Fact sheet for providers: https://www.fda.gov/media/253649/download     Fact sheet for patients: https://www.fda.gov/media/390487/download        Chief Complaint on Day of Discharge: No complaints of chest pain but thinks she may have a \"hiatal hernia\".    History of Present Illness on Day of Discharge:   Sitting up in bed.  She was asking for food earlier today.  She tells me she has not had any food since Monday.  She denies any chest pain but does report to an area at the xiphoid process that hurts with inspiration.  She tells me this area has been sore since she had nausea and vomiting a few days ago.  She denies nausea or vomiting today.  She feels as though she may have a hiatal hernia.  We discussed possible GI evaluation after discharge.  She did have 1 episode of diarrhea requested Imodium.    Hospital Course:  The patient is a 52 y.o. female who transferred to [facility] emergency room 11/1/2022 from [facility].  Patient reported 2 days earlier acute onset of chest pain associated with nausea and vomiting as well as diarrhea.  She continued to have chest pain the following day and presented to the outside emergency room.  She described the chest pain as pressure in her chest that radiated to her back.  She also reported epigastric discomfort.  Work-up at outside facility revealed troponin.  Initial troponin our facility 0.445 with repeat troponin 0.39.  Potassium 3.1, WBC 18.47.  White blood cell count outlying facility 21.9.  Patient " was started on heparin drip, Pepcid, aspirin at 1902.  She was placed on a nitroglycerin drip for chest pain.  She reported epigastric pain resolved.  She did report severe pain with deep inspiration. Chest x-ray was negative for any acute findings.  CT of the abdomen and pelvis showed mild wall thickening of the transverse and sigmoid colon.  CTA of the chest showed no evidence of pulmonary emboli.  EKG showed sinus tach with incomplete right bundle branch block, lateral infarct present.    She was admitted to the telemetry floor with elevated troponin and chest pain.  Serial troponins were monitored and EKG obtained.  Home medications reviewed and restarted if appropriate.  She was placed on Lovenox full dose and Tridil drip continued.  Cardiology consulted.  Troponins trended downward.  Patient remained on nitroglycerin drip but continued to complain of chest pain.  Cardiology recommended proceeding with cardiac catheterization and patient agreeable.  On 11/2/2022 Dr. Yan performed cardiac catheterization with findings of first diagonal 40 to 50% stenosis.  Otherwise, mild nonobstructive coronary disease.  Echocardiogram per cardiac catheterization ejection fraction 46 to 50%.  Patient was started on Toprol XL (beta-blocker) and lisinopril (ACE inhibitor).  Patient had no symptoms of heart failure.  Echocardiogram on 11/3/2022 reported ejection fraction 56-60%.  Left ventricular systolic function normal.  Left ventricular wall thickness consistent with hypertrophy.  Left ventricular diastolic function not assessed.  Suspect troponin elevation secondary to colitis with recent episode of nausea, vomiting and diarrhea.    Troponin elevation, non-STEMI type II suspect secondary to colitis.  Norvasc discontinued and switched to Toprol-XL (beta-blocker) and lisinopril (ACE inhibitor).  Plavix and statin continued.    Nonobstructive coronary artery disease per cardiac catheterization.  Discussed with Tony  "KOBI Hauser and no need for aspirin as patient is on Plavix.  Continue beta-blocker, ACE inhibitor, statin and Plavix.    She has a history of hyperlipidemia.  LDL 74, triglycerides 115, HDL 53, cholesterol 148.  Atorvastatin increased to 40 mg orally nightly.    Patient has a history of primary hypertension and takes amlodipine.  However, amlodipine discontinued and patient was started on Toprol XL 25 mg daily (beta-blocker) and lisinopril 10 mg orally daily (ACE inhibitor).  Blood pressure 128/70, 135/73 at discharge.    CT scan of the abdomen and pelvis showed mild wall thickening of the transverse and sigmoid colon.  This may be related to decompensated state but could represent early mild colitis.  Patient was started on Levaquin and Flagyl.  At discharge, patient was transitioned to Cipro twice daily for 5 additional days and will complete Flagyl after discharge.    Hemoglobin A1c 5.7.  Accu-Cheks with sliding scale insulin coverage ordered.  Glipizide twice daily home medication resumed.    Patient has a history of peripheral vascular disease and takes Plavix and Lipitor.  Patient has not been followed by vascular recently.  Atorvastatin increased to 40 mg orally nightly.  Plavix continued.    BuSpar, hydroxyzine, sertraline, trazodone, home medications continued for history of anxiety and depression.    Patient indicated that she had pain at the xiphoid process and she felt as though she might have a \"hiatal hernia\".  I discussed with patient findings of cardiac catheterization and recommend primary care provider refer to gastroenterology.    On 11/3/2022 she was cleared for discharge by cardiology after repeat echocardiogram that showed ejection fraction 56-60%.  Amlodipine discontinued and patient was started on Toprol-XL, lisinopril.  Atorvastatin increased to 40 mg orally nightly.  Plavix continued.  Patient will complete Cipro and Flagyl after discharge for sigmoid colitis.  She will follow-up with " "Carmencita KOBI Quintanilla on 11/11/2022 and follow-up with Dr. Yan in 1 month.    Condition on Discharge: Stable    Physical Exam on Discharge:  /70   Pulse 107   Temp 96 °F (35.6 °C) (Oral)   Resp 18   Ht 160 cm (63\")   Wt 68.9 kg (152 lb)   SpO2 93%   BMI 26.93 kg/m²   Physical Exam  Vitals and nursing note reviewed.   Constitutional:       Comments: Sitting up in bed.  No oxygen use.  No family in room.   HENT:      Head: Normocephalic and atraumatic.      Nose: No congestion.      Mouth/Throat:      Pharynx: Oropharynx is clear. No oropharyngeal exudate or posterior oropharyngeal erythema.   Eyes:      Extraocular Movements: Extraocular movements intact.      Pupils: Pupils are equal, round, and reactive to light.   Cardiovascular:      Rate and Rhythm: Normal rate and regular rhythm.      Heart sounds: No murmur heard.     Comments: Normal sinus rhythm 61 on telemetry.  Pulmonary:      Breath sounds: No wheezing, rhonchi or rales.   Abdominal:      General: There is no distension.      Palpations: Abdomen is soft.   Genitourinary:     Comments: Voiding.  Musculoskeletal:         General: No swelling or tenderness.      Cervical back: Normal range of motion and neck supple.   Skin:     General: Skin is warm and dry.   Neurological:      General: No focal deficit present.      Mental Status: She is alert and oriented to person, place, and time.   Psychiatric:         Mood and Affect: Mood normal.         Behavior: Behavior normal.         Thought Content: Thought content normal.         Judgment: Judgment normal.         Discharge Disposition:  Home or Self Care    Discharge Medications:     Discharge Medications      New Medications      Instructions Start Date   ciprofloxacin 500 MG tablet  Commonly known as: Cipro   500 mg, Oral, 2 Times Daily, Begin 11/4/2022   Start Date: November 4, 2022     influenza vac split quad 0.5 ML suspension prefilled syringe injection  Commonly known as: " FLUZONE,FLUARIX,AFLURIA,FLULAVAL   0.5 mL, Intramuscular, Once      lisinopril 10 MG tablet  Commonly known as: PRINIVIL,ZESTRIL   10 mg, Oral, Every 24 Hours Scheduled   Start Date: November 4, 2022     metoprolol succinate XL 25 MG 24 hr tablet  Commonly known as: TOPROL-XL   25 mg, Oral, Every 24 Hours Scheduled   Start Date: November 4, 2022     metroNIDAZOLE 500 MG tablet  Commonly known as: Flagyl   500 mg, Oral, 3 Times Daily         Changes to Medications      Instructions Start Date   atorvastatin 40 MG tablet  Commonly known as: LIPITOR  What changed:   · medication strength  · how much to take   40 mg, Oral, Daily   Start Date: November 4, 2022        Continue These Medications      Instructions Start Date   busPIRone 15 MG tablet  Commonly known as: BUSPAR   15 mg, Oral, 3 Times Daily      clopidogrel 75 MG tablet  Commonly known as: PLAVIX   75 mg, Oral, Daily      cyclobenzaprine 10 MG tablet  Commonly known as: FLEXERIL   10 mg, Oral, Nightly      famotidine 20 MG tablet  Commonly known as: PEPCID   20 mg, Oral, 2 Times Daily      glipizide 5 MG tablet  Commonly known as: GLUCOTROL   5 mg, Oral, 2 Times Daily Before Meals      hydrOXYzine pamoate 25 MG capsule  Commonly known as: VISTARIL   25 mg, Oral, Nightly      sertraline 100 MG tablet  Commonly known as: ZOLOFT   100 mg, Oral, Daily      traZODone 50 MG tablet  Commonly known as: DESYREL   50 mg, Oral, Nightly      vitamin D3 125 MCG (5000 UT) capsule capsule   5,000 Units, Oral, Daily         Stop These Medications    amLODIPine 10 MG tablet  Commonly known as: NORVASC     diclofenac 75 MG EC tablet  Commonly known as: VOLTAREN          Discharge Diet:   Diet Instructions     Advance Diet As Tolerated          Activity at Discharge:   Activity Instructions     Activity as Tolerated          Discharge Care Plan/Instructions:   1.  For worsening chest pain, epigastric pain seek medical attention.  2.  Discontinue Norvasc (amlodipine)  3.  Toprol  XL 25 mg orally daily  4.  Lisinopril 10 mg orally daily  5.  Cipro 500 mg twice daily for 5 days begin 11/4/2022  6.  Flagyl 500 mg 3 times daily for 18 doses.  7.  Follow-up with KOBI Plascencia 11/11/2022.  Consider referral to gastroenterology  8.  Follow-up with Dr. Yan 1 month    Follow-up Appointments:   1.  KOBI Plascencia 11/11/2022  2.  Dr. Yan 1 month    Test Results Pending at Discharge: None    Electronically signed by KOBI Barrera, 11/03/22, 15:56 CDT.    Time: 35 minutes for completion.  Discussed with Dr. Diehl, KOBI Sepulveda, and patient.       show

## 2022-11-03 NOTE — PROGRESS NOTES
[facility] -  Progress Note     LOS: 1 day   Patient Care Team:  Carmencita Quintanilla APRN as PCP - General (Family Medicine)    Chief Complaint:Chest Pain     Subjective     Interval History:   Notes feeling much better. Out of shower and feeling better. Minimal soreness to groin. Vital signs stable. No further diarrhea or vomiting.     Review of Systems:     Review of Systems   All other systems reviewed and are negative.    Objective     Vital Sign Min/Max for last 24 hours  Temp  Min: 96 °F (35.6 °C)  Max: 99 °F (37.2 °C)   BP  Min: 108/66  Max: 138/71   Pulse  Min: 85  Max: 107   Resp  Min: 16  Max: 24   SpO2  Min: 91 %  Max: 98 %   No data recorded   Weight  Min: 68.9 kg (152 lb)  Max: 68.9 kg (152 lb)         11/02/22 2126   Weight: 68.9 kg (152 lb)       Telemetry: NSR      Physical Exam:    Constitutional:       Appearance: Healthy appearance. Not in distress.   Eyes:      Pupils: Pupils are equal, round, and reactive to light.   HENT:      Head: Normocephalic and atraumatic.      Nose: Nose normal.    Mouth/Throat:      Dentition: Normal.   Pulmonary:      Effort: Pulmonary effort is normal.      Breath sounds: Normal breath sounds.   Chest:      Chest wall: Not tender to palpatation.   Cardiovascular:      PMI at left midclavicular line. Normal rate. Regular rhythm.      Murmurs: There is no murmur.   Pulses:     Intact distal pulses.   Edema:     Peripheral edema absent.   Abdominal:      General: Bowel sounds are normal.      Palpations: Abdomen is soft.   Musculoskeletal: Normal range of motion.      Cervical back: Normal range of motion. Skin:     General: Skin is warm and dry.   Neurological:      Mental Status: Alert, oriented to person, place, and time and oriented to person, place and time.   Psychiatric:         Attention and Perception: Attention normal.         Mood and Affect: Mood normal.       Results Review:   Lab Results (last 72 hours)     Procedure Component Value  Units Date/Time    POC Glucose Once [[id number]]  (Normal) Collected: 11/03/22 0859    Specimen: Blood Updated: 11/03/22 0918     Glucose 97 mg/dL      Comment: : [id number] Jaswant Santiago ID: [id number]       Basic Metabolic Panel [[id number]]  (Normal) Collected: 11/03/22 0357    Specimen: Blood Updated: 11/03/22 0453     Glucose 86 mg/dL      BUN 15 mg/dL      Creatinine 0.64 mg/dL      Sodium 142 mmol/L      Potassium 3.6 mmol/L      Chloride 107 mmol/L      CO2 23.0 mmol/L      Calcium 8.7 mg/dL      BUN/Creatinine Ratio 23.4     Anion Gap 12.0 mmol/L      eGFR 106.5 mL/min/1.73      Comment: National Kidney Foundation and American Society of Nephrology (ASN) Task Force recommended calculation based on the Chronic Kidney Disease Epidemiology Collaboration (CKD-EPI) equation refit without adjustment for race.       Narrative:      GFR Normal >60  Chronic Kidney Disease <60  Kidney Failure <15      CBC (No Diff) [[id number]]  (Abnormal) Collected: 11/03/22 0357    Specimen: Blood Updated: 11/03/22 0425     WBC 8.16 10*3/mm3      RBC 3.79 10*6/mm3      Hemoglobin 11.2 g/dL      Hematocrit 35.8 %      MCV 94.5 fL      MCH 29.6 pg      MCHC 31.3 g/dL      RDW 12.6 %      RDW-SD 43.2 fl      MPV 9.4 fL      Platelets 285 10*3/mm3     BNP [[id number]]  (Abnormal) Collected: 11/02/22 0519    Specimen: Blood Updated: 11/02/22 1025     proBNP 13,994.0 pg/mL     Narrative:      Among patients with dyspnea, NT-proBNP is highly sensitive for the detection of acute congestive heart failure. In addition NT-proBNP of <300 pg/ml effectively rules out acute congestive heart failure with 99% negative predictive value.      Lipid Panel [[id number]] Collected: 11/02/22 0519    Specimen: Blood Updated: 11/02/22 0608     Total Cholesterol 148 mg/dL      Triglycerides 115 mg/dL      HDL Cholesterol 53 mg/dL      LDL Cholesterol  74 mg/dL      VLDL Cholesterol 21 mg/dL      LDL/HDL Ratio 1.36    Narrative:      Cholesterol Reference  Ranges  (U.S. Department of Health and Human Services ATP III Classifications)    Desirable          <200 mg/dL  Borderline High    200-239 mg/dL  High Risk          >240 mg/dL      Triglyceride Reference Ranges  (U.S. Department of Health and Human Services ATP III Classifications)    Normal           <150 mg/dL  Borderline High  150-199 mg/dL  High             200-499 mg/dL  Very High        >500 mg/dL    HDL Reference Ranges  (U.S. Department of Health and Human Services ATP III Classifications)    Low     <40 mg/dl (major risk factor for CHD)  High    >60 mg/dl ('negative' risk factor for CHD)        LDL Reference Ranges  (U.S. Department of Health and Human Services ATP III Classifications)    Optimal          <100 mg/dL  Near Optimal     100-129 mg/dL  Borderline High  130-159 mg/dL  High             160-189 mg/dL  Very High        >189 mg/dL    Comprehensive Metabolic Panel [[id number]]  (Abnormal) Collected: 11/02/22 0519    Specimen: Blood Updated: 11/02/22 0608     Glucose 137 mg/dL      BUN 13 mg/dL      Creatinine 0.64 mg/dL      Sodium 139 mmol/L      Potassium 4.3 mmol/L      Chloride 105 mmol/L      CO2 23.0 mmol/L      Calcium 8.6 mg/dL      Total Protein 6.6 g/dL      Albumin 3.90 g/dL      ALT (SGPT) 50 U/L      AST (SGOT) 28 U/L      Alkaline Phosphatase 172 U/L      Total Bilirubin 0.4 mg/dL      Globulin 2.7 gm/dL      A/G Ratio 1.4 g/dL      BUN/Creatinine Ratio 20.3     Anion Gap 11.0 mmol/L      eGFR 106.5 mL/min/1.73      Comment: National Kidney Foundation and American Society of Nephrology (ASN) Task Force recommended calculation based on the Chronic Kidney Disease Epidemiology Collaboration (CKD-EPI) equation refit without adjustment for race.       Narrative:      GFR Normal >60  Chronic Kidney Disease <60  Kidney Failure <15      Hemoglobin A1c [[id number]]  (Abnormal) Collected: 11/02/22 0519    Specimen: Blood Updated: 11/02/22 0605     Hemoglobin A1C 5.70 %     Narrative:       Hemoglobin A1C Ranges:    Increased Risk for Diabetes  5.7% to 6.4%  Diabetes                     >= 6.5%  Diabetic Goal                < 7.0%    Troponin [[id number]]  (Abnormal) Collected: 11/02/22 0519    Specimen: Blood Updated: 11/02/22 0558     Troponin T 0.273 ng/mL     Narrative:      Troponin T Reference Range:  <= 0.03 ng/mL-   Negative for AMI  >0.03 ng/mL-     Abnormal for myocardial necrosis.  Clinicians would have to utilize clinical acumen, EKG, Troponin and serial changes to determine if it is an Acute Myocardial Infarction or myocardial injury due to an underlying chronic condition.       Results may be falsely decreased if patient taking Biotin.      CBC Auto Differential [[id number]]  (Abnormal) Collected: 11/02/22 0519    Specimen: Blood Updated: 11/02/22 0541     WBC 11.93 10*3/mm3      RBC 3.75 10*6/mm3      Hemoglobin 11.2 g/dL      Hematocrit 34.3 %      MCV 91.5 fL      MCH 29.9 pg      MCHC 32.7 g/dL      RDW 12.6 %      RDW-SD 41.9 fl      MPV 9.5 fL      Platelets 315 10*3/mm3      Neutrophil % 71.9 %      Lymphocyte % 14.9 %      Monocyte % 12.5 %      Eosinophil % 0.0 %      Basophil % 0.3 %      Immature Grans % 0.4 %      Neutrophils, Absolute 8.57 10*3/mm3      Lymphocytes, Absolute 1.78 10*3/mm3      Monocytes, Absolute 1.49 10*3/mm3      Eosinophils, Absolute 0.00 10*3/mm3      Basophils, Absolute 0.04 10*3/mm3      Immature Grans, Absolute 0.05 10*3/mm3      nRBC 0.0 /100 WBC     Magnesium [[id number]]  (Normal) Collected: 11/01/22 2238    Specimen: Blood Updated: 11/02/22 0122     Magnesium 1.7 mg/dL     [place] Draw [[id number]] Collected: 11/01/22 2040    Specimen: Blood Updated: 11/01/22 2346    Narrative:      The following orders were created for panel order [place] Draw.  Procedure                               Abnormality         Status                     ---------                               -----------         ------                     Green Top (Gel)[[id number]]                                   Final result               Lavender Top[[id number]]                                     Final result               Red Top[[id number]]                                          Final result               Light Blue Top[[id number]]                                   Final result                 Please view results for these tests on the individual orders.    Red Top [[id number]] Collected: 11/01/22 2238    Specimen: Blood Updated: 11/01/22 2346     Extra Tube Hold for add-ons.     Comment: Auto resulted.       Troponin [[id number]]  (Abnormal) Collected: 11/01/22 2238    Specimen: Blood Updated: 11/01/22 2309     Troponin T 0.393 ng/mL     Narrative:      Troponin T Reference Range:  <= 0.03 ng/mL-   Negative for AMI  >0.03 ng/mL-     Abnormal for myocardial necrosis.  Clinicians would have to utilize clinical acumen, EKG, Troponin and serial changes to determine if it is an Acute Myocardial Infarction or myocardial injury due to an underlying chronic condition.       Results may be falsely decreased if patient taking Biotin.      Lipase [[id number]]  (Abnormal) Collected: 11/01/22 2040    Specimen: Blood Updated: 11/01/22 2201     Lipase 11 U/L     COVID PRE-OP / PRE-PROCEDURE SCREENING ORDER (NO ISOLATION) - Swab, Nasopharynx [[id number]]  (Normal) Collected: 11/01/22 2055    Specimen: Swab from Nasopharynx Updated: 11/01/22 2147    Narrative:      The following orders were created for panel order COVID PRE-OP / PRE-PROCEDURE SCREENING ORDER (NO ISOLATION) - Swab, Nasopharynx.  Procedure                               Abnormality         Status                     ---------                               -----------         ------                     COVID-19,CEPHEID/ALBINO,CO...[[id number]]  Normal              Final result                 Please view results for these tests on the individual orders.    COVID-19,CEPHEID/ALBINO,COR/CRESENCIO/PAD/KIRT/MAD IN-HOUSE(OR EMERGENT/ADD-ON),NP SWAB IN TRANSPORT MEDIA 3-4  HR TAT, RT-PCR - Swab, Nasopharynx [[id number]]  (Normal) Collected: 11/01/22 2055    Specimen: Swab from Nasopharynx Updated: 11/01/22 2147     COVID19 Not Detected    Narrative:      Fact sheet for providers: https://www.fda.gov/media/050106/download     Fact sheet for patients: https://www.fda.gov/media/185278/download  Fact sheet for providers: https://www.fda.gov/media/179426/download     Fact sheet for patients: https://www.fda.gov/media/587887/download    Protime-INR [[id number]]  (Abnormal) Collected: 11/01/22 2040    Specimen: Blood Updated: 11/01/22 2145     Protime 14.2 Seconds      INR 1.15    aPTT [[id number]]  (Abnormal) Collected: 11/01/22 2040    Specimen: Blood Updated: 11/01/22 2145     PTT 68.7 seconds     Green Top (Gel) [[id number]] Collected: 11/01/22 2040    Specimen: Blood Updated: 11/01/22 2145     Extra Tube Hold for add-ons.     Comment: Auto resulted.       Lavender Top [[id number]] Collected: 11/01/22 2040    Specimen: Blood Updated: 11/01/22 2145     Extra Tube hold for add-on     Comment: Auto resulted       Light Blue Top [[id number]] Collected: 11/01/22 2040    Specimen: Blood Updated: 11/01/22 2145     Extra Tube Hold for add-ons.     Comment: Auto resulted       Troponin [[id number]]  (Abnormal) Collected: 11/01/22 2040    Specimen: Blood Updated: 11/01/22 2140     Troponin T 0.445 ng/mL     Narrative:      Troponin T Reference Range:  <= 0.03 ng/mL-   Negative for AMI  >0.03 ng/mL-     Abnormal for myocardial necrosis.  Clinicians would have to utilize clinical acumen, EKG, Troponin and serial changes to determine if it is an Acute Myocardial Infarction or myocardial injury due to an underlying chronic condition.       Results may be falsely decreased if patient taking Biotin.      Comprehensive Metabolic Panel [[id number]]  (Abnormal) Collected: 11/01/22 2040    Specimen: Blood Updated: 11/01/22 2139     Glucose 140 mg/dL      BUN 13 mg/dL      Creatinine 0.53 mg/dL      Sodium 139  mmol/L      Potassium 3.1 mmol/L      Chloride 103 mmol/L      CO2 22.0 mmol/L      Calcium 8.9 mg/dL      Total Protein 6.9 g/dL      Albumin 4.10 g/dL      ALT (SGPT) 60 U/L      AST (SGOT) 35 U/L      Alkaline Phosphatase 199 U/L      Total Bilirubin 0.3 mg/dL      Globulin 2.8 gm/dL      A/G Ratio 1.5 g/dL      BUN/Creatinine Ratio 24.5     Anion Gap 14.0 mmol/L      eGFR 111.4 mL/min/1.73      Comment: National Kidney Foundation and American Society of Nephrology (ASN) Task Force recommended calculation based on the Chronic Kidney Disease Epidemiology Collaboration (CKD-EPI) equation refit without adjustment for race.       Narrative:      GFR Normal >60  Chronic Kidney Disease <60  Kidney Failure <15      CBC & Differential [[id number]]  (Abnormal) Collected: 11/01/22 2040    Specimen: Blood Updated: 11/01/22 2114    Narrative:      The following orders were created for panel order CBC & Differential.  Procedure                               Abnormality         Status                     ---------                               -----------         ------                     CBC Auto Differential[[id number]]        Abnormal            Final result                 Please view results for these tests on the individual orders.    CBC Auto Differential [[id number]]  (Abnormal) Collected: 11/01/22 2040    Specimen: Blood Updated: 11/01/22 2114     WBC 18.47 10*3/mm3      RBC 3.95 10*6/mm3      Hemoglobin 11.9 g/dL      Hematocrit 35.9 %      MCV 90.9 fL      MCH 30.1 pg      MCHC 33.1 g/dL      RDW 12.6 %      RDW-SD 41.7 fl      MPV 9.7 fL      Platelets 377 10*3/mm3      Neutrophil % 85.5 %      Lymphocyte % 5.5 %      Monocyte % 8.1 %      Eosinophil % 0.0 %      Basophil % 0.2 %      Immature Grans % 0.7 %      Neutrophils, Absolute 15.78 10*3/mm3      Lymphocytes, Absolute 1.02 10*3/mm3      Monocytes, Absolute 1.50 10*3/mm3      Eosinophils, Absolute 0.00 10*3/mm3      Basophils, Absolute 0.04 10*3/mm3       Immature Grans, Absolute 0.13 10*3/mm3      nRBC 0.0 /100 WBC               Echo EF Estimated  No results found for: ECHOEFEST      Cath Ejection Fraction Quantitative  No results found for: CATHEF        Medication Review: yes  Current Facility-Administered Medications   Medication Dose Route Frequency Provider Last Rate Last Admin   • acetaminophen (TYLENOL) 160 MG/5ML solution 650 mg  650 mg Oral Q4H PRN Raymundo Yan DO        Or   • acetaminophen (TYLENOL) suppository 650 mg  650 mg Rectal Q4H PRN Raymundo Yan DO       • acetaminophen (TYLENOL) tablet 650 mg  650 mg Oral Q4H PRN Raymundo Yan DO       • atorvastatin (LIPITOR) tablet 40 mg  40 mg Oral Daily Raymundo Yan DO   40 mg at 11/03/22 0932   • busPIRone (BUSPAR) tablet 15 mg  15 mg Oral BID Raymundo Yan DO   15 mg at 11/03/22 0931   • clopidogrel (PLAVIX) tablet 75 mg  75 mg Oral Daily Raymundo Yan DO   75 mg at 11/03/22 0931   • cyclobenzaprine (FLEXERIL) tablet 5 mg  5 mg Oral Nightly Raymundo Yan DO   5 mg at 11/02/22 2027   • Enoxaparin Sodium (LOVENOX) syringe 80 mg  1 mg/kg Subcutaneous Q12H Raymundo Yan, DO   80 mg at 11/03/22 0508   • famotidine (PEPCID) injection 20 mg  20 mg Intravenous Q12H Raymundo Yan DO   20 mg at 11/02/22 2029   • glipizide (GLUCOTROL) tablet 5 mg  5 mg Oral BID AC Raymundo Yan DO   5 mg at 11/03/22 0930   • hydrOXYzine (ATARAX) tablet 50 mg  50 mg Oral Daily Raymundo Yan DO   50 mg at 11/03/22 0928   • influenza vac split quad (FLUZONE,FLUARIX,AFLURIA,FLULAVAL) injection 0.5 mL  0.5 mL Intramuscular During Hospitalization Raymundo Yan DO       • ketorolac (TORADOL) injection 15 mg  15 mg Intravenous Q6H PRN Kerri Smallwood DO   15 mg at 11/02/22 2029   • levoFLOXacin (LEVAQUIN) 500 mg/100 mL D5W (premix) (LEVAQUIN) 500 mg  500 mg Intravenous Q24H  Raymundo Yan DO   500 mg at 11/02/22 1822   • lisinopril (PRINIVIL,ZESTRIL) tablet 10 mg  10 mg Oral Q24H Tony Hauser APRN   10 mg at 11/03/22 1113   • loperamide (IMODIUM) capsule 2 mg  2 mg Oral 4x Daily PRN Kerri Smallwood, DO   2 mg at 11/03/22 0508   • metoprolol succinate XL (TOPROL-XL) 24 hr tablet 25 mg  25 mg Oral Q24H Alf Diehl DO   25 mg at 11/03/22 0935   • metroNIDAZOLE (FLAGYL) IVPB 500 mg  500 mg Intravenous Q8H Raymundo Yan DO   500 mg at 11/03/22 1226   • nitroglycerin (TRIDIL) 200 mcg/ml infusion  5-200 mcg/min Intravenous Titrated Raymundo Yan DO 6 mL/hr at 11/02/22 1056 20 mcg/min at 11/02/22 1056   • ondansetron (ZOFRAN) tablet 4 mg  4 mg Oral Q6H PRN Raymundo Yan DO        Or   • ondansetron (ZOFRAN) injection 4 mg  4 mg Intravenous Q6H PRN Raymundo Yan DO   4 mg at 11/03/22 0856   • sertraline (ZOLOFT) tablet 100 mg  100 mg Oral Daily Raymundo Yan DO   100 mg at 11/03/22 0932   • sodium chloride 0.9 % flush 10 mL  10 mL Intravenous Q12H Raymundo Yan DO   10 mL at 11/02/22 2030   • sodium chloride 0.9 % flush 10 mL  10 mL Intravenous PRN Raymundo Yan DO       • sodium chloride 0.9 % infusion  50 mL/hr Intravenous Continuous Alf Diehl DO 50 mL/hr at 11/03/22 0913 50 mL/hr at 11/03/22 0913   • traZODone (DESYREL) tablet 50 mg  50 mg Oral Nightly Raymundo Yan DO   50 mg at 11/03/22 0026   • vitamin D3 capsule 5,000 Units  5,000 Units Oral Daily Raymundo Yan DO   5,000 Units at 11/03/22 0975         Assessment & Plan       Elevated troponin secondary to suspected colitis    Chest pain, atypical, epigastric and with deep breathing    NSTEMI type 2 secondary to suspectd colitis (non-ST elevated myocardial infarction) (HCC)    Hypokalemia    Transaminitis    Anxiety disorder    Leukocytosis    Long term current use of  antithrombotics/antiplatelets    LV dysfunction    NSTEMI/ Elevated Troponin though to be type II secondary to colitis. Coronaries with no obstruction. LVEF noted to be borderline low on cath. Echo pending. Norvasc switched to Toprol and Lisinopril     Nonobstructive CAD- Aspirin, Statin, Plavix, Toprol and Lisinopril. Now pain free. Echo pending     Colitis- abdominal pain has improved. No further vomiting and diarrhea. Defer treatment to primary team     Peripheral Vascular Disease- notes LUE disease. Med Management     Hyperlipidemia- LDL goal less than 70    Hypertension- BP stable. Replacing Norvasc with Lisinopril and Toprol.     LV Dysfunction- 46-50% on cath. No heart failure symptoms. Starting Lisinopril and Toprol.     Once echo reviewed okay for discharge. Follow up with Dr. Yan in 1 month.       Electronically signed by KOBI Sepulveda, 11/03/22, 1:02 PM CDT.

## 2022-11-03 NOTE — PLAN OF CARE
Problem: Adult Inpatient Plan of Care  Goal: Plan of Care Review  Outcome: Ongoing, Progressing  Flowsheets (Taken 11/3/2022 2937)  Progress: no change  Plan of Care Reviewed With:   patient   spouse  Outcome Evaluation: At the beginning of the shift, pt c/o pain to her epigastric area with radiation to her back. Dr. Smallwood notified. GI cocktail & PRN IV Toradol medicine given as ordered with good relief. Pt c/o diarrhea. MD notified. PRN Immodium ordered and given as ordered. S/ST HR:  on tele. VSS. R groin dressing removed this AM. R groin puncture site CDI. Up x adlib. Safety maintained.

## 2022-11-04 LAB
QT INTERVAL: 356 MS
QT INTERVAL: 382 MS
QTC INTERVAL: 470 MS
QTC INTERVAL: 497 MS

## 2022-11-04 NOTE — PAYOR COMM NOTE
"REF: [id number]    [facility]  RANDAL,   [phone]  OR   FAX   [phone]      Dina Pedersen (52 y.o. Female)     Date of Birth   1970    Social Security Number       Address   [address] [facility] [place] [address]    Home Phone   [phone]    MRN   [id number]       [employer]   Other    Marital Status                               Admission Date   11/1/22    Admission Type   Emergency    Admitting Provider   Alf Diehl DO    Attending Provider       Department, Room/Bed   [facility] 4B, 445/1       Discharge Date   11/3/2022    Discharge Disposition   Home or Self Care    Discharge Destination                               Attending Provider: (none)   Allergies: Penicillins, Betadine [Povidone Iodine]    Isolation: None   Infection: None   Code Status: Prior    Ht: 160 cm (63\")   Wt: 68.9 kg (152 lb)    Admission Cmt: None   Principal Problem: Elevated troponin secondary to suspected colitis [R77.8]                 Active Insurance as of 11/1/2022     Primary Coverage     Payor Plan Insurance Group Employer/Plan Group    [employer] PPO [id number]     Payor Plan Address Payor Plan Phone Number Payor Plan Fax Number Effective Dates    [id number] [phone]  1/1/2020 - None Entered    [place] [address]       Subscriber Name Subscriber Birth Date Member ID       DINA PEDERSEN 1970 [id number]                 Emergency Contacts      (Rel.) Home Phone Work Phone Mobile Phone    Jah Pedersen (Spouse) [phone] -- --               Discharge Summary      Rosie Simpson, APRJEAN at 11/03/22 1328     Attestation signed by Alf Diehl DO at 11/03/22 3496    This visit was performed by both a physician and an APC. I personally evaluated and examined the patient. I performed all aspects of the MDM as documented.    Patient seen and evaluated.  Feeling better today.  No chest pain.  Abdominal pain " improved.  Tolerating p.o.  No fevers or chills.  No wheezing.  Her cardiac cath ended up having no significant coronary disease.  LV gram during cath question some mild decrease in LV function but 2D echo with preserved EF.  Regardless her blood pressure meds were changed to metoprolol and lisinopril.  She is tolerating these.  Will discharge to outpatient follow-up as below.    Electronically signed by Alf Diehl DO, 11/3/2022, 18:41 CDT.                        [facility] Medicine Services  DISCHARGE SUMMARY     Date of Admission: 11/1/2022  Date of Discharge:  11/3/2022  Primary Care Physician: Carmencita Quintanilla APRN    Presenting Problem/Chief Complaint:  Chest pain.  Transferred for elevated troponin    Final Discharge Diagnoses:  Active Hospital Problems    Diagnosis    • **Elevated troponin secondary to suspected colitis    • Sigmoid colitis    • Chest pain, atypical, epigastric and with deep breathing    • NSTEMI type 2 secondary to suspectd colitis (non-ST elevated myocardial infarction) (HCC)    • Hypokalemia    • Transaminitis    • Anxiety disorder    • Leukocytosis    • Long term current use of antithrombotics/antiplatelets      Consults: Dr. Yan, cardiology    Procedures Performed:   Cardiac catheterization 11/2/2022  Impression:  1.  Mild, nonobstructive coronary artery disease as described above  2.  Mildly reduced systolic function  3.  Hyperlipidemia  4.  Hypertension  5.  Type 2 diabetes  6.  Peripheral vascular disease     Pertinent Test Results:   Results for orders placed during the hospital encounter of 11/01/22    Adult Transthoracic Echo Complete W/ Cont if Necessary Per Protocol    Interpretation Summary  •  Left ventricular diastolic function was not assessed.  •  Left ventricular systolic function is normal. Left ventricular ejection fraction appears to be 56 - 60%.  •  The left ventricular cavity is mildly dilated. Left ventricular wall  thickness is consistent with concentric hypertrophy.  •  Mild pulmonary hypertension is present.  •  No hemodynamically significant valvular disease by Doppler exam.      Imaging Results (All)     Procedure Component Value Units Date/Time    CT Abdomen Pelvis With Contrast [[id number]] Collected: 11/02/22 0442     Updated: 11/02/22 0450    Narrative:      EXAM/TECHNIQUE: CT abdomen pelvis with IV contrast     INDICATION: chest pain, epigastric pain, vomiting, diarrhea, elevated  troponin     COMPARISON: None available.     DLP: 272 mGy cm. Automated exposure control was also utilized to  decrease patient radiation dose.     FINDINGS:     Lung bases are described in a separate same-day report.     No suspicious focal liver lesion. The gallbladder is surgically absent.  No biliary ductal dilatation. Pancreas appears normal. Spleen is  unremarkable. Adrenal glands appear normal.     No solid renal mass. No urolithiasis or hydronephrosis. No focal urinary  bladder wall thickening.      Sigmoid diverticulosis. There is a long segment of apparent sigmoid  colon wall thickening on axial image 75, although this may be secondary  to decompressed state. No definite pericolonic fat stranding. There is  also apparent wall thickening of the transverse colon which is also  decompressed. The appendix appears normal. No abnormal small bowel  distention or evidence of active small bowel inflammation.     No ascites or free pelvic fluid. Uterus and adnexa appear unremarkable.  No pelvic mass or pelvic collection.     Atherosclerotic nonaneurysmal abdominal aorta. No abdominal or pelvic  adenopathy.      No acute soft tissue finding. Multilevel lumbar spine degenerative  change, greatest at L5-S1. No acute osseous finding.       Impression:         Mild wall thickening of the transverse and sigmoid colon. This may be  related to decompressed state but also could represent an early mild  acute colitis. No other acute abdominopelvic  findings.     These findings are in agreement with the critical and emergent findings  from the [facility] preliminary report.     This report was finalized on 11/02/2022 04:47 by Dr. Paul Stevens MD.    CT Angiogram Chest [[id number]] Collected: 11/02/22 0435     Updated: 11/02/22 0443    Narrative:      EXAM/TECHNIQUE: CT chest angiography with 3D MIP images with IV contrast     INDICATION: chest pain, epigastric pain, vomiting, diarrhea, elevated  troponin     COMPARISON: None available.     DLP: 218 mGy cm. Automated exposure control was also utilized to  decrease patient radiation dose.     FINDINGS:     No evidence of pulmonary embolus. The main pulmonary artery is  nondilated. Thoracic aorta is normal in caliber and contains  atherosclerotic calcification. Trace pericardial fluid. Mild coronary  artery atherosclerotic calcification.      The central airways are clear. No pleural effusion. Atelectasis is  present in both posterior lower lungs. No definite consolidation.  Moderate centrilobular emphysema. No noncalcified pulmonary nodule or  mass identified. No enlarged lymph nodes in the chest.      No acute soft tissue finding. Upper abdomen is described in a separate  same-day report. No suspicious osseous finding.       Impression:         1.  No evidence of pulmonary embolus.      2.  Atelectasis in both posterior lower lungs.     3.  Moderate emphysema.     These findings are in agreement with the critical and emergent findings  from the [facility] preliminary report.  This report was finalized on 11/02/2022 04:40 by Dr. Paul Stevens MD.    XR Chest 1 View [[id number]] Collected: 11/01/22 2108     Updated: 11/01/22 2112    Narrative:      EXAMINATION:  XR CHEST 1 VW-  11/1/2022 9:03 PM CDT     HISTORY: Chest pain.     COMPARISON: 02/21/2006.     TECHNIQUE: Single view AP image.     FINDINGS:  There is hypoventilation with vascular crowding. There is  mild atelectasis in the lung bases. There is mild stable  bronchial wall  thickening. The heart is normal in size. No acute bony abnormality is  seen.       Impression:      1. Hypoventilation with vascular crowding. Mild atelectasis in the lung  bases.  2. Stable bronchial wall thickening.        This report was finalized on 11/01/2022 21:09 by Dr. Dariel Jaime MD.        LAB RESULTS:      Lab 11/03/22 0357 11/02/22 0519 11/01/22 2040   WBC 8.16 11.93* 18.47*   HEMOGLOBIN 11.2* 11.2* 11.9*   HEMATOCRIT 35.8 34.3 35.9   PLATELETS 285 315 377   NEUTROS ABS  --  8.57* 15.78*   IMMATURE GRANS (ABS)  --  0.05 0.13*   LYMPHS ABS  --  1.78 1.02   MONOS ABS  --  1.49* 1.50*   EOS ABS  --  0.00 0.00   MCV 94.5 91.5 90.9   PROTIME  --   --  14.2   APTT  --   --  68.7*         Lab 11/03/22 0357 11/02/22 0519 11/01/22 2238 11/01/22 2040   SODIUM 142 139  --  139   POTASSIUM 3.6 4.3  --  3.1*   CHLORIDE 107 105  --  103   CO2 23.0 23.0  --  22.0   ANION GAP 12.0 11.0  --  14.0   BUN 15 13  --  13   CREATININE 0.64 0.64  --  0.53*   EGFR 106.5 106.5  --  111.4   GLUCOSE 86 137*  --  140*   CALCIUM 8.7 8.6  --  8.9   MAGNESIUM  --   --  1.7  --    HEMOGLOBIN A1C  --  5.70*  --   --          Lab 11/02/22 0519 11/01/22 2040   TOTAL PROTEIN 6.6 6.9   ALBUMIN 3.90 4.10   GLOBULIN 2.7 2.8   ALT (SGPT) 50* 60*   AST (SGOT) 28 35*   BILIRUBIN 0.4 0.3   ALK PHOS 172* 199*   LIPASE  --  11*         Lab 11/02/22  0519 11/01/22 2238 11/01/22 2040   PROBNP 13,994.0*  --   --    TROPONIN T 0.273* 0.393* 0.445*   PROTIME  --   --  14.2   INR  --   --  1.15*         Lab 11/02/22  0519   CHOLESTEROL 148   LDL CHOL 74   HDL CHOL 53   TRIGLYCERIDES 115             Brief Urine Lab Results     None        Microbiology Results (last 10 days)     Procedure Component Value - Date/Time    COVID PRE-OP / PRE-PROCEDURE SCREENING ORDER (NO ISOLATION) - Swab, Nasopharynx [[id number]]  (Normal) Collected: 11/01/22 2055    Lab Status: Final result Specimen: Swab from Nasopharynx Updated: 11/01/22 6964     "Narrative:      The following orders were created for panel order COVID PRE-OP / PRE-PROCEDURE SCREENING ORDER (NO ISOLATION) - Swab, Nasopharynx.  Procedure                               Abnormality         Status                     ---------                               -----------         ------                     COVID-19,CEPHEID/ALBNIO,CO...[[id number]]  Normal              Final result                 Please view results for these tests on the individual orders.    COVID-19,CEPHEID/ALBINO,COR/CRESENCIO/PAD/KIRT/MAD IN-HOUSE(OR EMERGENT/ADD-ON),NP SWAB IN TRANSPORT MEDIA 3-4 HR TAT, RT-PCR - Swab, Nasopharynx [[id number]]  (Normal) Collected: 11/01/22 2055    Lab Status: Final result Specimen: Swab from Nasopharynx Updated: 11/01/22 2147     COVID19 Not Detected    Narrative:      Fact sheet for providers: https://www.fda.gov/media/551977/download     Fact sheet for patients: https://www.fda.gov/media/169282/download  Fact sheet for providers: https://www.fda.gov/media/240461/download     Fact sheet for patients: https://www.fda.gov/media/532998/download        Chief Complaint on Day of Discharge: No complaints of chest pain but thinks she may have a \"hiatal hernia\".    History of Present Illness on Day of Discharge:   Sitting up in bed.  She was asking for food earlier today.  She tells me she has not had any food since Monday.  She denies any chest pain but does report to an area at the xiphoid process that hurts with inspiration.  She tells me this area has been sore since she had nausea and vomiting a few days ago.  She denies nausea or vomiting today.  She feels as though she may have a hiatal hernia.  We discussed possible GI evaluation after discharge.  She did have 1 episode of diarrhea requested Imodium.    Hospital Course:  The patient is a 52 y.o. female who transferred to [facility] emergency room 11/1/2022 from [facility].  Patient reported 2 days earlier acute onset of chest pain " associated with nausea and vomiting as well as diarrhea.  She continued to have chest pain the following day and presented to the outside emergency room.  She described the chest pain as pressure in her chest that radiated to her back.  She also reported epigastric discomfort.  Work-up at outside facility revealed troponin.  Initial troponin our facility 0.445 with repeat troponin 0.39.  Potassium 3.1, WBC 18.47.  White blood cell count outlying facility 21.9.  Patient was started on heparin drip, Pepcid, aspirin at 1902.  She was placed on a nitroglycerin drip for chest pain.  She reported epigastric pain resolved.  She did report severe pain with deep inspiration. Chest x-ray was negative for any acute findings.  CT of the abdomen and pelvis showed mild wall thickening of the transverse and sigmoid colon.  CTA of the chest showed no evidence of pulmonary emboli.  EKG showed sinus tach with incomplete right bundle branch block, lateral infarct present.    She was admitted to the telemetry floor with elevated troponin and chest pain.  Serial troponins were monitored and EKG obtained.  Home medications reviewed and restarted if appropriate.  She was placed on Lovenox full dose and Tridil drip continued.  Cardiology consulted.  Troponins trended downward.  Patient remained on nitroglycerin drip but continued to complain of chest pain.  Cardiology recommended proceeding with cardiac catheterization and patient agreeable.  On 11/2/2022 Dr. Yan performed cardiac catheterization with findings of first diagonal 40 to 50% stenosis.  Otherwise, mild nonobstructive coronary disease.  Echocardiogram per cardiac catheterization ejection fraction 46 to 50%.  Patient was started on Toprol XL (beta-blocker) and lisinopril (ACE inhibitor).  Patient had no symptoms of heart failure.  Echocardiogram on 11/3/2022 reported ejection fraction 56-60%.  Left ventricular systolic function normal.  Left ventricular wall thickness  "consistent with hypertrophy.  Left ventricular diastolic function not assessed.  Suspect troponin elevation secondary to colitis with recent episode of nausea, vomiting and diarrhea.    Troponin elevation, non-STEMI type II suspect secondary to colitis.  Norvasc discontinued and switched to Toprol-XL (beta-blocker) and lisinopril (ACE inhibitor).  Plavix and statin continued.    Nonobstructive coronary artery disease per cardiac catheterization.  Discussed with KOBI Sepulveda and no need for aspirin as patient is on Plavix.  Continue beta-blocker, ACE inhibitor, statin and Plavix.    She has a history of hyperlipidemia.  LDL 74, triglycerides 115, HDL 53, cholesterol 148.  Atorvastatin increased to 40 mg orally nightly.    Patient has a history of primary hypertension and takes amlodipine.  However, amlodipine discontinued and patient was started on Toprol XL 25 mg daily (beta-blocker) and lisinopril 10 mg orally daily (ACE inhibitor).  Blood pressure 128/70, 135/73 at discharge.    CT scan of the abdomen and pelvis showed mild wall thickening of the transverse and sigmoid colon.  This may be related to decompensated state but could represent early mild colitis.  Patient was started on Levaquin and Flagyl.  At discharge, patient was transitioned to Cipro twice daily for 5 additional days and will complete Flagyl after discharge.    Hemoglobin A1c 5.7.  Accu-Cheks with sliding scale insulin coverage ordered.  Glipizide twice daily home medication resumed.    Patient has a history of peripheral vascular disease and takes Plavix and Lipitor.  Patient has not been followed by vascular recently.  Atorvastatin increased to 40 mg orally nightly.  Plavix continued.    BuSpar, hydroxyzine, sertraline, trazodone, home medications continued for history of anxiety and depression.    Patient indicated that she had pain at the xiphoid process and she felt as though she might have a \"hiatal hernia\".  I discussed with patient " "findings of cardiac catheterization and recommend primary care provider refer to gastroenterology.    On 11/3/2022 she was cleared for discharge by cardiology after repeat echocardiogram that showed ejection fraction 56-60%.  Amlodipine discontinued and patient was started on Toprol-XL, lisinopril.  Atorvastatin increased to 40 mg orally nightly.  Plavix continued.  Patient will complete Cipro and Flagyl after discharge for sigmoid colitis.  She will follow-up with KOBI Plascencia on 11/11/2022 and follow-up with Dr. Yan in 1 month.    Condition on Discharge: Stable    Physical Exam on Discharge:  /70   Pulse 107   Temp 96 °F (35.6 °C) (Oral)   Resp 18   Ht 160 cm (63\")   Wt 68.9 kg (152 lb)   SpO2 93%   BMI 26.93 kg/m²   Physical Exam  Vitals and nursing note reviewed.   Constitutional:       Comments: Sitting up in bed.  No oxygen use.  No family in room.   HENT:      Head: Normocephalic and atraumatic.      Nose: No congestion.      Mouth/Throat:      Pharynx: Oropharynx is clear. No oropharyngeal exudate or posterior oropharyngeal erythema.   Eyes:      Extraocular Movements: Extraocular movements intact.      Pupils: Pupils are equal, round, and reactive to light.   Cardiovascular:      Rate and Rhythm: Normal rate and regular rhythm.      Heart sounds: No murmur heard.     Comments: Normal sinus rhythm 61 on telemetry.  Pulmonary:      Breath sounds: No wheezing, rhonchi or rales.   Abdominal:      General: There is no distension.      Palpations: Abdomen is soft.   Genitourinary:     Comments: Voiding.  Musculoskeletal:         General: No swelling or tenderness.      Cervical back: Normal range of motion and neck supple.   Skin:     General: Skin is warm and dry.   Neurological:      General: No focal deficit present.      Mental Status: She is alert and oriented to person, place, and time.   Psychiatric:         Mood and Affect: Mood normal.         Behavior: Behavior normal.         " Thought Content: Thought content normal.         Judgment: Judgment normal.         Discharge Disposition:  Home or Self Care    Discharge Medications:     Discharge Medications      New Medications      Instructions Start Date   ciprofloxacin 500 MG tablet  Commonly known as: Cipro   500 mg, Oral, 2 Times Daily, Begin 11/4/2022   Start Date: November 4, 2022     influenza vac split quad 0.5 ML suspension prefilled syringe injection  Commonly known as: FLUZONE,FLUARIX,AFLURIA,FLULAVAL   0.5 mL, Intramuscular, Once      lisinopril 10 MG tablet  Commonly known as: PRINIVIL,ZESTRIL   10 mg, Oral, Every 24 Hours Scheduled   Start Date: November 4, 2022     metoprolol succinate XL 25 MG 24 hr tablet  Commonly known as: TOPROL-XL   25 mg, Oral, Every 24 Hours Scheduled   Start Date: November 4, 2022     metroNIDAZOLE 500 MG tablet  Commonly known as: Flagyl   500 mg, Oral, 3 Times Daily         Changes to Medications      Instructions Start Date   atorvastatin 40 MG tablet  Commonly known as: LIPITOR  What changed:   · medication strength  · how much to take   40 mg, Oral, Daily   Start Date: November 4, 2022        Continue These Medications      Instructions Start Date   busPIRone 15 MG tablet  Commonly known as: BUSPAR   15 mg, Oral, 3 Times Daily      clopidogrel 75 MG tablet  Commonly known as: PLAVIX   75 mg, Oral, Daily      cyclobenzaprine 10 MG tablet  Commonly known as: FLEXERIL   10 mg, Oral, Nightly      famotidine 20 MG tablet  Commonly known as: PEPCID   20 mg, Oral, 2 Times Daily      glipizide 5 MG tablet  Commonly known as: GLUCOTROL   5 mg, Oral, 2 Times Daily Before Meals      hydrOXYzine pamoate 25 MG capsule  Commonly known as: VISTARIL   25 mg, Oral, Nightly      sertraline 100 MG tablet  Commonly known as: ZOLOFT   100 mg, Oral, Daily      traZODone 50 MG tablet  Commonly known as: DESYREL   50 mg, Oral, Nightly      vitamin D3 125 MCG (5000 UT) capsule capsule   5,000 Units, Oral, Daily          Stop These Medications    amLODIPine 10 MG tablet  Commonly known as: NORVASC     diclofenac 75 MG EC tablet  Commonly known as: VOLTAREN          Discharge Diet:   Diet Instructions     Advance Diet As Tolerated          Activity at Discharge:   Activity Instructions     Activity as Tolerated          Discharge Care Plan/Instructions:   1.  For worsening chest pain, epigastric pain seek medical attention.  2.  Discontinue Norvasc (amlodipine)  3.  Toprol XL 25 mg orally daily  4.  Lisinopril 10 mg orally daily  5.  Cipro 500 mg twice daily for 5 days begin 11/4/2022  6.  Flagyl 500 mg 3 times daily for 18 doses.  7.  Follow-up with KOBI Plascencia 11/11/2022.  Consider referral to gastroenterology  8.  Follow-up with Dr. Yan 1 month    Follow-up Appointments:   1.  KOBI Plascencia 11/11/2022  2.  Dr. Yan 1 month    Test Results Pending at Discharge: None    Electronically signed by KOBI Barrera, 11/03/22, 15:56 CDT.    Time: 35 minutes for completion.  Discussed with Dr. Contreras, Tony Hauser, KOBI, and patient.        Electronically signed by Alf Contreras DO at 11/03/22 1842       Discharge Order (From admission, onward)     Start     Ordered    11/03/22 1332  Discharge patient  Once        Expected Discharge Date: 11/03/22    Discharge Disposition: Home or Self Care    Physician of Record for Attribution - Please select from Treatment Team: ALF CONTRERAS [[id number]]    Review needed by CMO to determine Physician of Record: No       Question Answer Comment   Physician of Record for Attribution - Please select from Treatment Team ALF CONTRERAS    Review needed by CMO to determine Physician of Record No        11/03/22 6661

## 2022-11-17 ENCOUNTER — OFFICE VISIT (OUTPATIENT)
Dept: CARDIOLOGY | Facility: CLINIC | Age: 52
End: 2022-11-17

## 2022-11-17 VITALS
HEART RATE: 115 BPM | HEIGHT: 63 IN | SYSTOLIC BLOOD PRESSURE: 140 MMHG | WEIGHT: 152 LBS | DIASTOLIC BLOOD PRESSURE: 72 MMHG | OXYGEN SATURATION: 98 % | BODY MASS INDEX: 26.93 KG/M2

## 2022-11-17 DIAGNOSIS — E78.2 MIXED HYPERLIPIDEMIA: ICD-10-CM

## 2022-11-17 DIAGNOSIS — I10 PRIMARY HYPERTENSION: ICD-10-CM

## 2022-11-17 DIAGNOSIS — I21.4 NSTEMI (NON-ST ELEVATED MYOCARDIAL INFARCTION): ICD-10-CM

## 2022-11-17 DIAGNOSIS — R07.89 CHEST PAIN, ATYPICAL: Primary | ICD-10-CM

## 2022-11-17 PROCEDURE — 99214 OFFICE O/P EST MOD 30 MIN: CPT | Performed by: EMERGENCY MEDICINE

## 2022-11-17 RX ORDER — DILTIAZEM HYDROCHLORIDE 120 MG/1
120 CAPSULE, COATED, EXTENDED RELEASE ORAL DAILY
Qty: 90 CAPSULE | Refills: 3 | Status: SHIPPED | OUTPATIENT
Start: 2022-11-17

## 2022-11-17 RX ORDER — ATORVASTATIN CALCIUM 40 MG/1
40 TABLET, FILM COATED ORAL DAILY
Qty: 90 TABLET | Refills: 3 | Status: SHIPPED | OUTPATIENT
Start: 2022-11-17

## 2022-11-17 RX ORDER — METOPROLOL SUCCINATE 25 MG/1
50 TABLET, EXTENDED RELEASE ORAL
Qty: 180 TABLET | Refills: 3 | Status: SHIPPED | OUTPATIENT
Start: 2022-11-17

## 2022-11-17 RX ORDER — CLOPIDOGREL BISULFATE 75 MG/1
75 TABLET ORAL DAILY
Qty: 90 TABLET | Refills: 3 | Status: SHIPPED | OUTPATIENT
Start: 2022-11-17

## 2022-11-17 RX ORDER — LISINOPRIL 10 MG/1
10 TABLET ORAL
Qty: 30 TABLET | Refills: 1 | Status: SHIPPED | OUTPATIENT
Start: 2022-11-17

## 2022-11-17 RX ORDER — METOPROLOL SUCCINATE 25 MG/1
50 TABLET, EXTENDED RELEASE ORAL
Qty: 30 TABLET | Refills: 1 | Status: SHIPPED | OUTPATIENT
Start: 2022-11-17 | End: 2022-11-17 | Stop reason: SDUPTHER

## 2022-11-17 NOTE — PROGRESS NOTES
[facility] - CARDIOLOGY  New Patient Initial Outpatient [facility]    Primary Care Physician: Carmencita Quintanilla, KOBI    Subjective     Chief Complaint   Patient presents with   • Chest Pain     2 WK HFU    • Fatigue   • Rapid Heart Rate   • Shortness of Breath   • Surgical Clearance     DR. CAIN/ JOSÉ MIGUEL        History of Present Illness    The patient is a 52-year-old female that presents today for an initial visit.    She reports having difficulty getting her strength back as she is continuously fatigued. She maintains that she has chest pain and when she is breathing hard her chest becomes sore and she has pain in her back as well. When asked the patient confirmed her troponin levels have been high.    The patient was experiencing emesis on the night of 11/14/2022 that caried over in the morning of 11/15/2022. She reports that her emesis was so violent that she felt as if something traveled up into her chest. She was unsure if it was associated with her hiatal hernia but maintains that she had severe pain in her chest that radiated to her back. She was eventually transported to the emergency department by her .    She does report having shortness of breath with activity and has difficulty breathing         Review of Systems   Constitutional: Positive for malaise/fatigue. Negative for diaphoresis and fever.   HENT: Negative for congestion.    Eyes: Negative for vision loss in left eye and vision loss in right eye.   Cardiovascular: Positive for chest pain and dyspnea on exertion. Negative for claudication, irregular heartbeat, leg swelling, orthopnea, palpitations and syncope.   Respiratory: Positive for shortness of breath. Negative for cough and wheezing.    Hematologic/Lymphatic: Negative for adenopathy.   Skin: Negative for rash.   Musculoskeletal: Negative for joint pain and joint swelling.   Gastrointestinal: Negative for abdominal pain, diarrhea, nausea and vomiting.   Neurological: Positive for  light-headedness. Negative for excessive daytime sleepiness, dizziness, focal weakness, numbness and weakness.   Psychiatric/Behavioral: Negative for depression. The patient does not have insomnia.         Otherwise complete ROS reviewed and negative except as mentioned in the HPI.      Past Medical History:   Past Medical History:   Diagnosis Date   • Anxiety and depression    • Hypertension    • Peripheral vascular disease (HCC)        Past Surgical History:  Past Surgical History:   Procedure Laterality Date   • APPENDECTOMY     • BACK SURGERY     • CARDIAC CATHETERIZATION N/A 2022    Procedure: Left Heart Cath;  Surgeon: Raymundo Yan DO;  Location:  [facility] CATH [facility]AS[facility];  Service: Cardiovascular;  Laterality: N/A;   •  SECTION     • CYST REMOVAL         Family History: family history includes Diabetes in her brother, mother, and sister; Heart disease in her brother, father, mother, and sister; Hypertension in her brother and mother; Kidney disease in her mother.    Social History:  reports that she has quit smoking. Her smoking use included cigarettes. She has a 37.50 pack-year smoking history. She does not have any smokeless tobacco history on file. She reports that she does not drink alcohol and does not use drugs.    Medications:  Prior to Admission medications    Medication Sig Start Date End Date Taking? Authorizing Provider   atorvastatin (LIPITOR) 40 MG tablet Take 1 tablet by mouth Daily. 22  Yes Raymundo Yan DO   busPIRone (BUSPAR) 15 MG tablet Take 1 tablet by mouth 3 (Three) Times a Day.   Yes Gloria Suh MD   clopidogrel (PLAVIX) 75 MG tablet Take 1 tablet by mouth Daily. 22  Yes Raymundo Yan DO   cyclobenzaprine (FLEXERIL) 10 MG tablet Take 1 tablet by mouth Every Night.   Yes Gloria Suh MD   famotidine (PEPCID) 20 MG tablet Take 1 tablet by mouth 2 (Two) Times a Day.   Yes Gloria Suh MD  "  glipizide (GLUCOTROL) 5 MG tablet Take 1 tablet by mouth 2 (Two) Times a Day Before Meals.   Yes Provider, Historical, MD   hydrOXYzine pamoate (VISTARIL) 25 MG capsule Take 1 capsule by mouth Every Night.   Yes Provider, Historical, MD   lisinopril (PRINIVIL,ZESTRIL) 10 MG tablet Take 1 tablet by mouth Daily. 11/17/22  Yes Raymundo Yan DO   metoprolol succinate XL (TOPROL-XL) 25 MG 24 hr tablet Take 2 tablets by mouth Daily. 11/17/22  Yes Raymundo Yan DO   sertraline (ZOLOFT) 100 MG tablet Take 1 tablet by mouth Daily.   Yes Provider, Historical, MD   traZODone (DESYREL) 50 MG tablet Take 1 tablet by mouth Every Night.   Yes Provider, Historical, MD   vitamin D3 125 MCG (5000 UT) capsule capsule Take 1 capsule by mouth Daily.   Yes Provider, MD Gloria   atorvastatin (LIPITOR) 40 MG tablet Take 1 tablet by mouth Daily. 11/4/22 11/17/22 Yes Alf Diehl DO   clopidogrel (PLAVIX) 75 MG tablet Take 1 tablet by mouth Daily.  11/17/22 Yes Provider, MD Gloria   lisinopril (PRINIVIL,ZESTRIL) 10 MG tablet Take 1 tablet by mouth Daily. 11/4/22 11/17/22 Yes Alf Diehl DO   metoprolol succinate XL (TOPROL-XL) 25 MG 24 hr tablet Take 1 tablet by mouth Daily. 11/4/22 11/17/22 Yes Alf Diehl DO   metoprolol succinate XL (TOPROL-XL) 25 MG 24 hr tablet Take 2 tablets by mouth Daily. 11/17/22 11/17/22 Yes Raymundo Yan DO   dilTIAZem CD (Cardizem CD) 120 MG 24 hr capsule Take 1 capsule by mouth Daily. 11/17/22   Raymundo Yan DO   metroNIDAZOLE (Flagyl) 500 MG tablet Take 1 tablet by mouth 3 (Three) Times a Day. 11/3/22 11/17/22  Alf Diehl DO     Allergies:  Allergies   Allergen Reactions   • Penicillins Unknown - High Severity     As child   • Betadine [Povidone Iodine] Rash       Objective     Vital Signs: /72   Pulse 115   Ht 160 cm (62.99\")   Wt 68.9 kg (152 lb)   SpO2 98%   BMI 26.93 kg/m²     Vitals and nursing note " reviewed.   Constitutional:       Appearance: Normal and healthy appearance. Well-developed and not in distress.   Eyes:      Extraocular Movements: Extraocular movements intact.      Pupils: Pupils are equal, round, and reactive to light.   HENT:      Head: Normocephalic and atraumatic.    Mouth/Throat:      Pharynx: Oropharynx is clear.   Neck:      Vascular: JVD normal.      Trachea: Trachea normal.   Pulmonary:      Effort: Pulmonary effort is normal.      Breath sounds: Normal breath sounds. No wheezing. No rhonchi. No rales.   Cardiovascular:      PMI at left midclavicular line. Normal rate. Regular rhythm. Normal S1. Normal S2.      Murmurs: There is no murmur.      No gallop. No rub.   Pulses:     Dorsalis pedis: 2+ bilaterally.     Posterior tibial: 2+ bilaterally.  Abdominal:      General: Bowel sounds are normal.      Palpations: Abdomen is soft.      Tenderness: There is no abdominal tenderness.   Musculoskeletal: Normal range of motion.      Cervical back: Normal range of motion and neck supple. Skin:     General: Skin is warm and dry.      Capillary Refill: Capillary refill takes less than 2 seconds.   Feet:      Right foot:      Skin integrity: Skin integrity normal.      Left foot:      Skin integrity: Skin integrity normal.   Neurological:      Mental Status: Alert and oriented to person, place and time.      Cranial Nerves: Cranial nerves are intact.      Sensory: Sensation is intact.      Motor: Motor function is intact.      Coordination: Coordination is intact.   Psychiatric:         Speech: Speech normal.         Behavior: Behavior is cooperative.         Results Reviewed:    Procedures      Lab Results   Component Value Date    CHOL 148 11/02/2022    TRIG 115 11/02/2022    HDL 53 11/02/2022    VLDL 21 11/02/2022    LDLHDL 1.36 11/02/2022     Lab Results   Component Value Date    HGBA1C 5.70 (H) 11/02/2022       Assessment / Plan        Problem List Items Addressed This Visit        Cardiac and  Vasculature    NSTEMI type 2 secondary to suspectd colitis (non-ST elevated myocardial infarction) (HCC)    Relevant Medications    metoprolol succinate XL (TOPROL-XL) 25 MG 24 hr tablet    clopidogrel (PLAVIX) 75 MG tablet    dilTIAZem CD (Cardizem CD) 120 MG 24 hr capsule    Primary hypertension    Relevant Medications    metoprolol succinate XL (TOPROL-XL) 25 MG 24 hr tablet    lisinopril (PRINIVIL,ZESTRIL) 10 MG tablet    dilTIAZem CD (Cardizem CD) 120 MG 24 hr capsule    Mixed hyperlipidemia    Relevant Medications    atorvastatin (LIPITOR) 40 MG tablet       Symptoms and Signs    Chest pain, atypical, epigastric and with deep breathing - Primary     Cardiac health maintenance  - The patient is following up from her recent visit to the emergency department. Lab results from the hospital indicated her troponin levels have been elevated at .44, .39 and .273 ng/mL. Her white blood cell count was 93996/microliters and her BNP was 62107. She has no pulmonary emboli. Her CT scan of her stomach showed possible colitis and her ECHO showed normal systolic function 56 to 60 BPM with some dilation and thickness. Her valves were normal.    Advised the patient on the results of her heart catheterization which indicated she has a large obtuse marginal, second obtuse marginal, third, obtuse marginal. Everything was wide open. Her LAD is wide open. There is some disease in one of her diagonals.    Continue on current cardiac regimen.  No new testing needed today.      Transcribed from ambient dictation for Raymundo Yan DO by Basim Pickett.  11/17/22   12:22 CST    Patient or patient representative verbalized consent to the visit recording.  I have personally performed the services described in this document as transcribed by the above individual, and it is both accurate and complete.  Raymundo Yan DO  11/20/2022  18:29 CST

## 2022-11-20 PROBLEM — I10 PRIMARY HYPERTENSION: Status: ACTIVE | Noted: 2022-11-20

## 2022-11-20 PROBLEM — E78.2 MIXED HYPERLIPIDEMIA: Status: ACTIVE | Noted: 2022-11-20

## 2022-11-21 ENCOUNTER — TELEPHONE (OUTPATIENT)
Dept: CARDIOLOGY | Facility: CLINIC | Age: 52
End: 2022-11-21

## 2022-11-21 NOTE — TELEPHONE ENCOUNTER
PER STAFF MESSAGE: Please send a letter to Dr. Jean Baptiste's office letting them know that pt is OK to proceed with endoscopy at this time.  No need for any further cardiac testing.  Low risk.  Thank you.       LETTER FAXED

## 2022-11-21 NOTE — TELEPHONE ENCOUNTER
[facility] NEED TO KNOW IF YOU CLEARED PT FOR ENDOSCOPY ,  YOU SAW PT ON 11/17/22 FOR THIS AND DID NOT SEE SX IN NOTE    PLEASE ADVISE    FAX NUMBER [phone]

## 2023-03-06 ENCOUNTER — TRANSCRIBE ORDERS (OUTPATIENT)
Dept: ADMINISTRATIVE | Facility: HOSPITAL | Age: 53
End: 2023-03-06
Payer: COMMERCIAL

## 2023-03-06 DIAGNOSIS — R20.0 NUMBNESS IN BOTH HANDS: Primary | ICD-10-CM

## 2023-03-06 DIAGNOSIS — R20.0 ANESTHESIA OF SKIN: ICD-10-CM

## 2023-05-08 RX ORDER — ATORVASTATIN CALCIUM 40 MG/1
TABLET, FILM COATED ORAL
Qty: 90 TABLET | Refills: 3 | Status: SHIPPED | OUTPATIENT
Start: 2023-05-08

## 2023-10-03 RX ORDER — DILTIAZEM HYDROCHLORIDE 120 MG/1
CAPSULE, COATED, EXTENDED RELEASE ORAL
Qty: 90 CAPSULE | Refills: 3 | OUTPATIENT
Start: 2023-10-03

## 2023-11-20 RX ORDER — DILTIAZEM HYDROCHLORIDE 120 MG/1
120 CAPSULE, COATED, EXTENDED RELEASE ORAL DAILY
Qty: 90 CAPSULE | Refills: 3 | Status: SHIPPED | OUTPATIENT
Start: 2023-11-20

## 2024-01-24 RX ORDER — CLOPIDOGREL BISULFATE 75 MG/1
75 TABLET ORAL DAILY
Qty: 30 TABLET | Refills: 0 | Status: SHIPPED | OUTPATIENT
Start: 2024-01-24

## 2024-05-02 RX ORDER — CLOPIDOGREL BISULFATE 75 MG/1
75 TABLET ORAL DAILY
OUTPATIENT
Start: 2024-05-02

## 2024-06-04 RX ORDER — ATORVASTATIN CALCIUM 40 MG/1
TABLET, FILM COATED ORAL
OUTPATIENT
Start: 2024-06-04

## 2024-11-06 RX ORDER — DILTIAZEM HYDROCHLORIDE 120 MG/1
120 CAPSULE, COATED, EXTENDED RELEASE ORAL DAILY
OUTPATIENT
Start: 2024-11-06

## 2024-11-06 NOTE — TELEPHONE ENCOUNTER
APPOINTMENT IS NEEDED FOR FURTHER REFILLS. HER LAST VISIT WAS 11/17/2022. SHE WAS GIVEN A YEARS SUPPLY 11/19/2023 WITHOUT A F/U ON FILE.

## (undated) DEVICE — PINNACLE INTRODUCER SHEATH: Brand: PINNACLE

## (undated) DEVICE — CANN NASL ETCO2 LO/FLO A/

## (undated) DEVICE — ANGIO-SEAL VIP VASCULAR CLOSURE DEVICE: Brand: ANGIO-SEAL

## (undated) DEVICE — SOLIDIFIER LIQUI LOC PLUS 2000CC

## (undated) DEVICE — MODEL BT2000 P/N 700287-012KIT CONTENTS: MANIFOLD WITH SALINE AND CONTRAST PORTS, SALINE TUBING WITH SPIKE AND HAND SYRINGE, TRANSDUCER: Brand: BT2000 AUTOMATED MANIFOLD KIT

## (undated) DEVICE — PAD, DEFIB, ADULT, RADIOTRANS, PHYSIO: Brand: MEDLINE

## (undated) DEVICE — PK CATH CARD 30 CA/4

## (undated) DEVICE — GW STARTER FXD CORE J .035 3X150CM 3MM

## (undated) DEVICE — SOL IRR NACL 0.9PCT BT 1000ML

## (undated) DEVICE — FR5 INFINITI MULTIPAC: Brand: INFINITI

## (undated) DEVICE — MODEL AT P65, P/N 701554-001KIT CONTENTS: HAND CONTROLLER, 3-WAY HIGH-PRESSURE STOPCOCK WITH ROTATING END AND PREMIUM HIGH-PRESSURE TUBING: Brand: ANGIOTOUCH® KIT